# Patient Record
Sex: FEMALE | Race: WHITE | Employment: OTHER | URBAN - METROPOLITAN AREA
[De-identification: names, ages, dates, MRNs, and addresses within clinical notes are randomized per-mention and may not be internally consistent; named-entity substitution may affect disease eponyms.]

---

## 2017-10-19 ENCOUNTER — TELEPHONE (OUTPATIENT)
Dept: INTERNAL MEDICINE CLINIC | Age: 67
End: 2017-10-19

## 2017-10-19 NOTE — TELEPHONE ENCOUNTER
Patient informed  Mammogram results reviewed calcifications in right breast are likely benign but  Need to repeat in 6 mos to assure stability   Follow up in 6 mos was scheduled

## 2017-11-14 RX ORDER — CODEINE PHOSPHATE AND GUAIFENESIN 10; 100 MG/5ML; MG/5ML
5 SOLUTION ORAL
Qty: 240 ML | Refills: 0 | Status: SHIPPED | OUTPATIENT
Start: 2017-11-14 | End: 2018-04-26 | Stop reason: ALTCHOICE

## 2017-11-20 DIAGNOSIS — B02.9 HERPES ZOSTER WITHOUT COMPLICATION: Primary | ICD-10-CM

## 2017-11-20 RX ORDER — VALACYCLOVIR HYDROCHLORIDE 1 G/1
1000 TABLET, FILM COATED ORAL 3 TIMES DAILY
Qty: 21 TAB | Refills: 0 | Status: SHIPPED | OUTPATIENT
Start: 2017-11-20 | End: 2018-04-26 | Stop reason: SDUPTHER

## 2018-01-08 DIAGNOSIS — G47.09 OTHER INSOMNIA: Primary | ICD-10-CM

## 2018-01-08 RX ORDER — DIAZEPAM 5 MG/1
TABLET ORAL
Qty: 60 TAB | Refills: 5 | Status: SHIPPED | OUTPATIENT
Start: 2018-01-08 | End: 2018-04-26 | Stop reason: SDUPTHER

## 2018-01-08 NOTE — TELEPHONE ENCOUNTER
Requested Prescriptions     Pending Prescriptions Disp Refills    diazePAM (VALIUM) 5 mg tablet 60 Tab 5     Sig: Take 1 to 2 tablets at bedtime, as needed.

## 2018-04-19 ENCOUNTER — LAB ONLY (OUTPATIENT)
Dept: INTERNAL MEDICINE CLINIC | Age: 68
End: 2018-04-19

## 2018-04-19 DIAGNOSIS — E03.9 HYPOTHYROIDISM, UNSPECIFIED TYPE: ICD-10-CM

## 2018-04-19 DIAGNOSIS — E78.5 HYPERLIPIDEMIA, UNSPECIFIED HYPERLIPIDEMIA TYPE: ICD-10-CM

## 2018-04-19 DIAGNOSIS — Z91.89 AT RISK FOR HEPATITIS: ICD-10-CM

## 2018-04-19 DIAGNOSIS — Z00.00 ROUTINE PHYSICAL EXAMINATION: Primary | ICD-10-CM

## 2018-04-19 LAB
ALBUMIN SERPL-MCNC: 4.4 G/DL (ref 3.9–5.4)
ALKALINE PHOS POC: 90 U/L (ref 38–126)
ALT SERPL-CCNC: 38 U/L (ref 9–52)
AST SERPL-CCNC: 26 U/L (ref 14–36)
BACTERIA UA POCT, BACTPOCT: NORMAL
BILIRUB UR QL STRIP: NEGATIVE
BUN BLD-MCNC: 20 MG/DL (ref 7–17)
CALCIUM BLD-MCNC: 9.7 MG/DL (ref 8.4–10.2)
CASTS UA POCT: 0
CHLORIDE BLD-SCNC: 105 MMOL/L (ref 98–107)
CHOLEST SERPL-MCNC: 149 MG/DL (ref 0–200)
CK (CPK) POC: 44 U/L (ref 30–135)
CLUE CELLS, CLUEPOCT: NEGATIVE
CO2 POC: 28 MMOL/L (ref 22–32)
CREAT BLD-MCNC: 0.7 MG/DL (ref 0.7–1.2)
CRYSTALS UA POCT, CRYSPOCT: NEGATIVE
EGFR (POC): 90.9
EPITHELIAL CELLS POCT: NORMAL
GLUCOSE POC: 105 MG/DL (ref 65–105)
GLUCOSE UR-MCNC: NEGATIVE MG/DL
GRAN# POC: 3.1 K/UL (ref 2–7.8)
GRAN% POC: 50.6 % (ref 37–92)
HCT VFR BLD CALC: 40.3 % (ref 37–51)
HDLC SERPL-MCNC: 73 MG/DL (ref 35–130)
HGB BLD-MCNC: 13.7 G/DL (ref 12–18)
IRON POC: 106 UG/DL (ref 37–170)
IRON SATURATION POC: 27 % (ref 15–55)
KETONES P FAST UR STRIP-MCNC: NEGATIVE MG/DL
LDL CHOLESTEROL POC: 58.6 MG/DL (ref 0–130)
LY# POC: 2.5 K/UL (ref 0.6–4.1)
LY% POC: 44.7 % (ref 10–58.5)
MCH RBC QN: 31.7 PG (ref 26–32)
MCHC RBC-ENTMCNC: 34 G/DL (ref 30–36)
MCV RBC: 93 FL (ref 80–97)
MID #, POC: 0.2 K/UL (ref 0–1.8)
MID% POC: 4.7 % (ref 0.1–24)
MUCUS UA POCT, MUCPOCT: NORMAL
PH UR STRIP: 5 [PH] (ref 5–7)
PLATELET # BLD: 280 K/UL (ref 140–440)
POTASSIUM SERPL-SCNC: 4.6 MMOL/L (ref 3.6–5)
PROT SERPL-MCNC: 7 G/DL (ref 6.3–8.2)
PROT UR QL STRIP: NEGATIVE
RBC # BLD: 4.32 M/UL (ref 4.2–6.3)
RBC UA POCT, RBCPOCT: 0
SODIUM SERPL-SCNC: 145 MMOL/L (ref 137–145)
SP GR UR STRIP: 1.02 (ref 1.01–1.02)
T4 FREE SERPL-MCNC: 0.87 NG/DL (ref 0.71–1.85)
TCHOL/HDL RATIO (POC): 2 (ref 0–4)
TIBC POC: 387 UG/DL (ref 265–497)
TOTAL BILIRUBIN POC: 0.7 MG/DL (ref 0.2–1.3)
TRICH UA POCT, TRICHPOC: NEGATIVE
TRIGL SERPL-MCNC: 87 MG/DL (ref 0–200)
TSH BLD-ACNC: 2.14 UIU/ML (ref 0.4–4.2)
UA UROBILINOGEN AMB POC: NORMAL (ref 0.2–1)
URINALYSIS CLARITY POC: CLEAR
URINALYSIS COLOR POC: NORMAL
URINE BLOOD POC: NEGATIVE
URINE CULT COMMENT, POCT: NORMAL
URINE LEUKOCYTES POC: NORMAL
URINE NITRITES POC: NEGATIVE
VITAMIN D POC: 38.1 NG/ML (ref 30–96)
VLDLC SERPL CALC-MCNC: 17.4 MG/DL
WBC # BLD: 5.8 K/UL (ref 4.1–10.9)
WBC UA POCT, WBCPOCT: NORMAL
YEAST UA POCT, YEASTPOC: NEGATIVE

## 2018-04-20 LAB — HCV AB S/CO SERPL IA: <0.1 S/CO RATIO (ref 0–0.9)

## 2018-04-26 ENCOUNTER — OFFICE VISIT (OUTPATIENT)
Dept: INTERNAL MEDICINE CLINIC | Age: 68
End: 2018-04-26

## 2018-04-26 VITALS
HEIGHT: 59 IN | HEART RATE: 72 BPM | TEMPERATURE: 98 F | SYSTOLIC BLOOD PRESSURE: 118 MMHG | WEIGHT: 124 LBS | BODY MASS INDEX: 25 KG/M2 | OXYGEN SATURATION: 97 % | DIASTOLIC BLOOD PRESSURE: 74 MMHG

## 2018-04-26 DIAGNOSIS — Z23 ENCOUNTER FOR IMMUNIZATION: ICD-10-CM

## 2018-04-26 DIAGNOSIS — B02.9 HERPES ZOSTER WITHOUT COMPLICATION: ICD-10-CM

## 2018-04-26 DIAGNOSIS — K58.0 IRRITABLE BOWEL SYNDROME WITH DIARRHEA: ICD-10-CM

## 2018-04-26 DIAGNOSIS — G47.09 OTHER INSOMNIA: ICD-10-CM

## 2018-04-26 DIAGNOSIS — M81.0 POSTMENOPAUSAL OSTEOPOROSIS: Primary | ICD-10-CM

## 2018-04-26 DIAGNOSIS — Z00.00 ROUTINE PHYSICAL EXAMINATION: Primary | ICD-10-CM

## 2018-04-26 LAB — SPIROMETRY INTERPRETATION, SPITPOCT: NORMAL

## 2018-04-26 RX ORDER — DIAZEPAM 5 MG/1
TABLET ORAL
Qty: 60 TAB | Refills: 5 | Status: SHIPPED | OUTPATIENT
Start: 2018-04-26 | End: 2018-08-26 | Stop reason: SDUPTHER

## 2018-04-26 RX ORDER — DICYCLOMINE HYDROCHLORIDE 10 MG/1
10 CAPSULE ORAL 4 TIMES DAILY
Qty: 120 CAP | Refills: 11 | Status: SHIPPED | OUTPATIENT
Start: 2018-04-26 | End: 2019-05-14

## 2018-04-26 RX ORDER — RANITIDINE 300 MG/1
300 TABLET ORAL DAILY
Qty: 90 TAB | Refills: 3 | Status: SHIPPED | OUTPATIENT
Start: 2018-04-26 | End: 2018-08-27 | Stop reason: SDUPTHER

## 2018-04-26 RX ORDER — VALACYCLOVIR HYDROCHLORIDE 1 G/1
1000 TABLET, FILM COATED ORAL 3 TIMES DAILY
Qty: 21 TAB | Refills: 0 | Status: SHIPPED | OUTPATIENT
Start: 2018-04-26 | End: 2019-05-14

## 2018-04-26 NOTE — PROGRESS NOTES
Reviewed record in preparation for visit and have obtained necessary documentation. Identified pt with two pt identifiers(name and ). Chief Complaint   Patient presents with    Complete Physical        Coordination of Care Questionnaire:  :     1) Have you been to an emergency room, urgent care clinic since your last visit? Hospitalized since your last visit? When:  Where:  Diagnosis:          2) Have you seen or consulted any other health care providers outside of [address] [place] [address] since your last visit? When:  Where:  Diagnosis:   (Include any pap smears or colon screenings in this section.)  Reviewed record in preparation for visit and have obtained necessary documentation. Identified pt with two pt identifiers(name and ). Chief Complaint   Patient presents with    Complete Physical        Coordination of Care Questionnaire:  :     1) Have you been to an emergency room, urgent care clinic since your last visit? No    Hospitalized since your last visit? No              2) Have you seen or consulted any other health care providers outside of [address] [place] [address] since your last visit?  No

## 2018-04-26 NOTE — PATIENT INSTRUCTIONS

## 2018-04-26 NOTE — PROGRESS NOTES
Kimberly Sun comes to the office today for a comprehensive medical evaluation. Past Medical/Surgical History:  1. Asthma. 2. Hypercholesterolemia. 3. G3, P3, AB0, S/P  x three. 4. S/P laparoscopic lysis of adhesions. 5. Rosacea. 6. History of calcium oxylate renal calculi, S/P lithotripsy, most recently . 7. Moderately severe osteopenia, T score -2.2, on Reclast. She is overdue for her second infusion. 8. Recurrent herpes labialis. 9. Arteriosclerotic heart disease. CT of the heart in May, 2013 showed a coronary artery calcium score of 37.  10. Anderson fracture of the left fifth metatarsal . 11. Chronic insomnia. 12. Chronic low back pain with sciatica. 13. She had a patella dislocation and is being treated conservatively with physical therapy. That was in . Current Medications:  1. Atorvastatin 40 mg daily. 2. Diazepam 5 mg q.h.s. prn.  3. Dulera 100/5, two puffs b.i.d.  4. Singulair 10 mg daily. 5. Culturelle, one daily. 6. Zantac 300 mg daily. 7. Valtrex prn. Drug Allergies:  1. Penicillin. 2. She got an urticarial rash to TDAP. Social History:  She is . She works for a nonprofit as a  for [employer] She is a lifelong nonsmoker and social drinker. She is currently working full time. Her youngest daughter still lives at the house. Family History:  Her mother  at age 76 from a massive myocardial infarction. She also had hypertension and hypercholesterolemia. She has a brother who had angioplasty and stroke in his early 46s. Another brother has hypertension, hypercholesterolemia and arteriosclerotic heart disease. Her father is alive and well at age 80. He's had angioplasty, prostate cancer, hypertension and hypercholesterolemia. She had a paternal aunt with breast cancer. She had two daughters, both had congenital duplication of the ureter. Review of Systems:  Kimberly Sun is still in PT for her knee.   She says it's helping quite a bit. She's not had any further episodes of patella dislocation. She started having problems with her left shoulder, which she describes as it feels like it's coming out of joint. It's been bothering her about a month. She denies injury. She's having intermittent episodes of diarrhea and has had some fecal incontinence on occasion. This is intermittent. Right now she's having normal bowel movements and no pain. She works 65 hours a week. She is not exercising. Her mother in law has been in the hospital for two months with hip fracture and multiple complications. She stayed at the hospital every night until midnight. Her daughter is in counseling for drug use and still living at home. All of these have stressed her considerably. Physical Examination:  VITALS:  HT: 5'.  WT: 124. BP: 118/74. T: 98.  P: 72 and regular. O2 sat on room air: 97%. HEENT:  Head - normocephalic, atraumatic. Eyes - pupils midrange, equal directly and consensually. Extraocular movements are normal.  Ears, nose and throat are normal.  NECK:  Without JVD, adenopathy, thyromegaly or carotid bruits. CHEST:  Lungs are clear. BREASTS:  Without masses. CV:  Heart - quiet precordium. Regular rate and rhythm. No audible murmurs or gallops. ABDOMEN:  Soft, non tender, without hepatosplenomegaly, no abdominal masses or bruits or ascites are noted. EXTREMITIES:  Without edema. Pulses are normal.  SKIN:  No suspicious lesions. NEUROLOGIC:  Cranial nerves II-XII are intact. Strength, gait and mental status are normal for age. Studies:  Pulmonary function testing is normal. Resting EKG - normal sinus rhythm, within normal limits. Hearing testing shows a mild symmetrical high frequency hearing loss. Laboratory:  Hemoglobin and iron stores are normal.  Blood sugar, potassium, kidney function, calcium level and liver function tests are normal.  Urinalysis is normal.  TSH 2.1. Hep C antibody negative.   Vitamin D level 38.  Total cholesterol 149, triglycerides 87, LDL 59, HDL 73. Impression:  1. IBS with intermittent diarrhea and incontinence. 2. Asthma. 3. Hypercholesterolemia. 4. Arteriosclerotic heart disease as outlined. 5. Chronic insomnia. 6. Moderately severe osteopenia. 7. S/P colonoscopic polypectomy. Pathology serrated adenoma. 8. GERD with globus pharyngeus. Plan:  1. Continue current medication regimen as outlined. 2. Pneumovax 23 given today. She'll return after a month and get Shingrix. Tetanus, whooping cough, Prevnar 13 are up to date. 3. She's due for an infusion for Reclast.  Will make those arrangements. I told her I wanted her to have three infusions and then we'll repeat a bone density test.  4. Next colonoscopy due in July, 2019.   5. I see no reason to repeat heart scan at this time. 6. Mammogram is current at [facility] and we will get those reports. 7. Follow up here annually or sooner prn. All screenings were reviewed and results discussed with the patient, who verbalized understand and agreement with the plans. A copy of the after visit summary with a personalized health plan was provided to the patient on the day of the visit.

## 2018-05-10 RX ORDER — ZOLEDRONIC ACID 5 MG/100ML
5 INJECTION, SOLUTION INTRAVENOUS ONCE
Status: COMPLETED | OUTPATIENT
Start: 2018-05-14 | End: 2018-05-14

## 2018-05-14 ENCOUNTER — HOSPITAL ENCOUNTER (OUTPATIENT)
Dept: INFUSION THERAPY | Age: 68
Discharge: HOME | End: 2018-05-14
Payer: COMMERCIAL

## 2018-05-14 VITALS
HEART RATE: 78 BPM | RESPIRATION RATE: 18 BRPM | TEMPERATURE: 97.2 F | SYSTOLIC BLOOD PRESSURE: 122 MMHG | DIASTOLIC BLOOD PRESSURE: 74 MMHG

## 2018-05-14 LAB
ALBUMIN SERPL-MCNC: 3.9 G/DL (ref 3.5–5)
ANION GAP SERPL CALC-SCNC: 8 MMOL/L (ref 5–15)
BUN SERPL-MCNC: 26 MG/DL (ref 6–20)
BUN/CREAT SERPL: 33 (ref 12–20)
CALCIUM SERPL-MCNC: 9.1 MG/DL (ref 8.5–10.1)
CHLORIDE SERPL-SCNC: 106 MMOL/L (ref 97–108)
CO2 SERPL-SCNC: 26 MMOL/L (ref 21–32)
CREAT SERPL-MCNC: 0.8 MG/DL (ref 0.55–1.02)
GLUCOSE SERPL-MCNC: 100 MG/DL (ref 65–100)
PHOSPHATE SERPL-MCNC: 3.6 MG/DL (ref 2.6–4.7)
POTASSIUM SERPL-SCNC: 4 MMOL/L (ref 3.5–5.1)
SODIUM SERPL-SCNC: 140 MMOL/L (ref 136–145)

## 2018-05-14 PROCEDURE — 36415 COLL VENOUS BLD VENIPUNCTURE: CPT | Performed by: INTERNAL MEDICINE

## 2018-05-14 PROCEDURE — 96374 THER/PROPH/DIAG INJ IV PUSH: CPT

## 2018-05-14 PROCEDURE — 80069 RENAL FUNCTION PANEL: CPT | Performed by: INTERNAL MEDICINE

## 2018-05-14 PROCEDURE — 74011250636 HC RX REV CODE- 250/636: Performed by: INTERNAL MEDICINE

## 2018-05-14 RX ORDER — SODIUM CHLORIDE 9 MG/ML
50 INJECTION, SOLUTION INTRAVENOUS AS NEEDED
Status: DISPENSED | OUTPATIENT
Start: 2018-05-14 | End: 2018-05-15

## 2018-05-14 RX ORDER — SODIUM CHLORIDE 0.9 % (FLUSH) 0.9 %
5-10 SYRINGE (ML) INJECTION AS NEEDED
Status: ACTIVE | OUTPATIENT
Start: 2018-05-14 | End: 2018-05-15

## 2018-05-14 RX ADMIN — Medication 10 ML: at 09:20

## 2018-05-14 RX ADMIN — Medication 10 ML: at 10:26

## 2018-05-14 RX ADMIN — ZOLEDRONIC ACID 5 MG: 5 INJECTION, SOLUTION INTRAVENOUS at 10:10

## 2018-05-14 NOTE — PROGRESS NOTES
[facility] VISIT NOTE    0900  Pt arrived at [facility] ambulatory and in no distress for Reclast infusion. Assessment completed, no new complaints at this time. PIV started with no difficulty. Positive blood return noted and labs drawn. Recent Results (from the past 12 hour(s))   RENAL FUNCTION PANEL    Collection Time: 05/14/18  9:21 AM   Result Value Ref Range    Sodium 140 136 - 145 mmol/L    Potassium 4.0 3.5 - 5.1 mmol/L    Chloride 106 97 - 108 mmol/L    CO2 26 21 - 32 mmol/L    Anion gap 8 5 - 15 mmol/L    Glucose 100 65 - 100 mg/dL    BUN 26 (H) 6 - 20 MG/DL    Creatinine 0.80 0.55 - 1.02 MG/DL    BUN/Creatinine ratio 33 (H) 12 - 20      GFR est AA >60 >60 ml/min/1.73m2    GFR est non-AA >60 >60 ml/min/1.73m2    Calcium 9.1 8.5 - 10.1 MG/DL    Phosphorus 3.6 2.6 - 4.7 MG/DL    Albumin 3.9 3.5 - 5.0 g/dL     Medications received:  Reclast IV    Patient Vitals for the past 12 hrs:   Temp Pulse Resp BP   05/14/18 1026 97.2 °F (36.2 °C) 78 18 122/74   05/14/18 0904 96.8 °F (36 °C) 80 18 119/58     Tolerated treatment well, no adverse reaction noted. PIV removed per protocol. 1030  D/C'd from [facility] ambulatory and in no distress. Next appointment is to be scheduled by MD/pt.

## 2018-07-02 RX ORDER — ATORVASTATIN CALCIUM 40 MG/1
TABLET, FILM COATED ORAL
Qty: 90 TAB | Refills: 0 | Status: SHIPPED | OUTPATIENT
Start: 2018-07-02 | End: 2018-08-26 | Stop reason: SDUPTHER

## 2018-07-02 RX ORDER — MONTELUKAST SODIUM 10 MG/1
TABLET ORAL
Qty: 90 TAB | Refills: 0 | Status: SHIPPED | OUTPATIENT
Start: 2018-07-02 | End: 2018-08-26 | Stop reason: SDUPTHER

## 2018-08-26 DIAGNOSIS — G47.09 OTHER INSOMNIA: ICD-10-CM

## 2018-08-26 RX ORDER — DIAZEPAM 5 MG/1
TABLET ORAL
Qty: 60 TAB | Refills: 0 | Status: SHIPPED | OUTPATIENT
Start: 2018-08-26 | End: 2018-08-27 | Stop reason: SDUPTHER

## 2018-08-26 RX ORDER — ATORVASTATIN CALCIUM 40 MG/1
TABLET, FILM COATED ORAL
Qty: 90 TAB | Refills: 0 | Status: SHIPPED | OUTPATIENT
Start: 2018-08-26 | End: 2018-08-27 | Stop reason: SDUPTHER

## 2018-08-26 RX ORDER — MONTELUKAST SODIUM 10 MG/1
TABLET ORAL
Qty: 90 TAB | Refills: 0 | Status: SHIPPED | OUTPATIENT
Start: 2018-08-26 | End: 2018-08-27 | Stop reason: SDUPTHER

## 2018-08-27 DIAGNOSIS — K58.0 IRRITABLE BOWEL SYNDROME WITH DIARRHEA: ICD-10-CM

## 2018-08-27 DIAGNOSIS — G47.09 OTHER INSOMNIA: ICD-10-CM

## 2018-08-27 RX ORDER — DIAZEPAM 5 MG/1
TABLET ORAL
Qty: 60 TAB | Refills: 0 | Status: SHIPPED | OUTPATIENT
Start: 2018-08-27 | End: 2018-10-06 | Stop reason: SDUPTHER

## 2018-08-27 RX ORDER — MONTELUKAST SODIUM 10 MG/1
10 TABLET ORAL DAILY
Qty: 90 TAB | Refills: 3 | Status: SHIPPED | OUTPATIENT
Start: 2018-08-27 | End: 2019-05-14

## 2018-08-27 RX ORDER — ATORVASTATIN CALCIUM 40 MG/1
40 TABLET, FILM COATED ORAL DAILY
Qty: 90 TAB | Refills: 3 | Status: SHIPPED | OUTPATIENT
Start: 2018-08-27 | End: 2019-11-17 | Stop reason: SDUPTHER

## 2018-08-27 RX ORDER — RANITIDINE 300 MG/1
300 TABLET ORAL DAILY
Qty: 90 TAB | Refills: 3 | Status: SHIPPED | OUTPATIENT
Start: 2018-08-27 | End: 2020-07-07

## 2018-09-05 DIAGNOSIS — L29.9 ITCHING: Primary | ICD-10-CM

## 2018-09-05 RX ORDER — HYDROXYZINE 25 MG/1
25 TABLET, FILM COATED ORAL
Qty: 40 TAB | Refills: 4 | Status: SHIPPED | OUTPATIENT
Start: 2018-09-05 | End: 2018-09-15

## 2018-09-20 ENCOUNTER — OFFICE VISIT (OUTPATIENT)
Dept: INTERNAL MEDICINE CLINIC | Age: 68
End: 2018-09-20

## 2018-09-20 VITALS
WEIGHT: 126 LBS | BODY MASS INDEX: 25.45 KG/M2 | SYSTOLIC BLOOD PRESSURE: 143 MMHG | HEART RATE: 81 BPM | OXYGEN SATURATION: 99 % | TEMPERATURE: 97.7 F | DIASTOLIC BLOOD PRESSURE: 82 MMHG

## 2018-09-20 DIAGNOSIS — L13.0 DERMATITIS HERPETIFORMIS: Primary | ICD-10-CM

## 2018-09-20 NOTE — PROGRESS NOTES
Reviewed record in preparation for visit and have obtained necessary documentation. Identified pt with two pt identifiers(name and ). Chief Complaint   Patient presents with    Rash     itching   not sleeping        Coordination of Care Questionnaire:  :     1) Have you been to an emergency room, urgent care clinic since your last visit? No     Hospitalized since your last visit? No               2) Have you seen or consulted any other health care providers outside of [address] [place] [address] since your last visit?    Dermatology     When:

## 2018-09-20 NOTE — PROGRESS NOTES
Subjective: Denver Haley comes to the office today requesting lab work for Dr. Maite Washington. She has an intense pruritic rash over her whole body for the last month or so. She's been on topical and systemic steroids with no relief. 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 She had her third skin biopsy today. Plan:  1. I have ordered the requested tests, which I will discuss with Denver Haley and fax to Dr. Maite Washington when they are available. 2. I'd also like to review the last skin biopsy report.

## 2018-09-25 LAB
GLIADIN PEPTIDE IGA SER-ACNC: 9 UNITS (ref 0–19)
GLIADIN PEPTIDE IGG SER-ACNC: 33 UNITS (ref 0–19)
IGA SERPL-MCNC: 201 MG/DL (ref 87–352)
IGG SERPL-MCNC: 909 MG/DL (ref 700–1600)
IGM SERPL-MCNC: 91 MG/DL (ref 26–217)
PROT PATTERN SERPL IFE-IMP: NORMAL
TTG IGA SER-ACNC: <2 U/ML (ref 0–3)
TTG IGG SER-ACNC: <2 U/ML (ref 0–5)

## 2018-09-26 LAB
GRAN# POC: 3.1 K/UL (ref 2–7.8)
GRAN% POC: 56.2 % (ref 37–92)
HCT VFR BLD CALC: 41.3 % (ref 37–51)
HGB BLD-MCNC: 13.6 G/DL (ref 12–18)
LY# POC: 2.1 K/UL (ref 0.6–4.1)
LY% POC: 39.9 % (ref 10–58.5)
MCH RBC QN: 30.9 PG (ref 26–32)
MCHC RBC-ENTMCNC: 32.9 G/DL (ref 30–36)
MCV RBC: 94 FL (ref 80–97)
MID #, POC: 0.2 K/UL (ref 0–1.8)
MID% POC: 3.9 % (ref 0.1–24)
PLATELET # BLD: 266 K/UL (ref 140–440)
RBC # BLD: 4.39 M/UL (ref 4.2–6.3)
TSH BLD-ACNC: 1.52 UIU/ML (ref 0.4–4.2)
WBC # BLD: 5.4 K/UL (ref 4.1–10.9)

## 2018-10-06 DIAGNOSIS — G47.09 OTHER INSOMNIA: ICD-10-CM

## 2018-10-08 DIAGNOSIS — G47.09 OTHER INSOMNIA: ICD-10-CM

## 2018-10-08 RX ORDER — DIAZEPAM 5 MG/1
TABLET ORAL
Qty: 60 TAB | Refills: 0 | Status: SHIPPED | OUTPATIENT
Start: 2018-10-08 | End: 2019-05-14

## 2018-10-08 RX ORDER — DIAZEPAM 5 MG/1
TABLET ORAL
Qty: 60 TAB | Refills: 0 | Status: SHIPPED | OUTPATIENT
Start: 2018-10-08 | End: 2020-07-08 | Stop reason: SDUPTHER

## 2018-10-29 RX ORDER — PREDNISONE 10 MG/1
10 TABLET ORAL DAILY
Qty: 52 TAB | Refills: 0 | Status: SHIPPED | OUTPATIENT
Start: 2018-10-29 | End: 2019-02-08 | Stop reason: SDUPTHER

## 2018-11-20 ENCOUNTER — HOSPITAL ENCOUNTER (OUTPATIENT)
Dept: GENERAL RADIOLOGY | Age: 68
Discharge: HOME | End: 2018-11-20
Attending: ALLERGY & IMMUNOLOGY
Payer: COMMERCIAL

## 2018-11-20 DIAGNOSIS — R05.9 COUGH: ICD-10-CM

## 2018-11-20 PROCEDURE — 71046 X-RAY EXAM CHEST 2 VIEWS: CPT

## 2019-02-08 ENCOUNTER — HOSPITAL ENCOUNTER (OUTPATIENT)
Dept: GENERAL RADIOLOGY | Age: 69
Discharge: HOME | End: 2019-02-08
Attending: INTERNAL MEDICINE
Payer: COMMERCIAL

## 2019-02-08 DIAGNOSIS — R05.9 COUGH: ICD-10-CM

## 2019-02-08 PROCEDURE — 71046 X-RAY EXAM CHEST 2 VIEWS: CPT

## 2019-02-09 PROBLEM — R94.39 EBT (ELECTRON BEAM TOMOGRAPHY) ABNORMAL: Status: ACTIVE | Noted: 2019-02-09

## 2019-02-09 PROBLEM — F51.04 CHRONIC INSOMNIA: Status: ACTIVE | Noted: 2019-02-09

## 2019-02-09 PROBLEM — M75.42 IMPINGEMENT SYNDROME OF LEFT SHOULDER: Status: ACTIVE | Noted: 2019-02-09

## 2019-02-09 PROBLEM — M85.80 OSTEOPENIA: Status: ACTIVE | Noted: 2019-02-09

## 2019-02-09 PROBLEM — J45.909 MODERATE ASTHMA: Status: ACTIVE | Noted: 2019-02-09

## 2019-02-09 PROBLEM — N20.0 KIDNEY STONES, CALCIUM OXALATE: Status: ACTIVE | Noted: 2019-02-09

## 2019-02-09 PROBLEM — B00.1 FEVER BLISTER: Status: ACTIVE | Noted: 2019-02-09

## 2019-06-06 ENCOUNTER — HOSPITAL ENCOUNTER (OUTPATIENT)
Dept: BONE DENSITY | Age: 69
Discharge: HOME | End: 2019-06-06
Attending: INTERNAL MEDICINE
Payer: COMMERCIAL

## 2019-06-06 DIAGNOSIS — M85.80 OSTEOPENIA, UNSPECIFIED LOCATION: ICD-10-CM

## 2019-06-06 DIAGNOSIS — Z78.0 POST-MENOPAUSAL: ICD-10-CM

## 2019-06-06 PROCEDURE — 77080 DXA BONE DENSITY AXIAL: CPT

## 2019-06-12 PROBLEM — M81.0 POST-MENOPAUSAL OSTEOPOROSIS: Status: ACTIVE | Noted: 2019-06-12

## 2019-07-12 RX ORDER — ZOLEDRONIC ACID 5 MG/100ML
5 INJECTION, SOLUTION INTRAVENOUS ONCE
Status: COMPLETED | OUTPATIENT
Start: 2019-07-17 | End: 2019-07-17

## 2019-07-12 RX ORDER — SODIUM CHLORIDE 9 MG/ML
150 INJECTION, SOLUTION INTRAVENOUS AS NEEDED
Status: DISCONTINUED | OUTPATIENT
Start: 2019-07-17 | End: 2019-07-18 | Stop reason: HOSPADM

## 2019-07-17 ENCOUNTER — HOSPITAL ENCOUNTER (OUTPATIENT)
Dept: INFUSION THERAPY | Age: 69
Discharge: HOME | End: 2019-07-17
Payer: COMMERCIAL

## 2019-07-17 VITALS
DIASTOLIC BLOOD PRESSURE: 73 MMHG | SYSTOLIC BLOOD PRESSURE: 145 MMHG | RESPIRATION RATE: 16 BRPM | TEMPERATURE: 97.8 F | HEART RATE: 68 BPM | BODY MASS INDEX: 24.41 KG/M2 | WEIGHT: 125 LBS

## 2019-07-17 LAB
ALBUMIN SERPL-MCNC: 4 G/DL (ref 3.5–5)
ANION GAP SERPL CALC-SCNC: 6 MMOL/L (ref 5–15)
BUN SERPL-MCNC: 20 MG/DL (ref 6–20)
BUN/CREAT SERPL: 28 (ref 12–20)
CALCIUM SERPL-MCNC: 9.4 MG/DL (ref 8.5–10.1)
CHLORIDE SERPL-SCNC: 108 MMOL/L (ref 97–108)
CO2 SERPL-SCNC: 25 MMOL/L (ref 21–32)
CREAT SERPL-MCNC: 0.72 MG/DL (ref 0.55–1.02)
GLUCOSE SERPL-MCNC: 92 MG/DL (ref 65–100)
PHOSPHATE SERPL-MCNC: 3.6 MG/DL (ref 2.6–4.7)
POTASSIUM SERPL-SCNC: 4.4 MMOL/L (ref 3.5–5.1)
SODIUM SERPL-SCNC: 139 MMOL/L (ref 136–145)

## 2019-07-17 PROCEDURE — 36415 COLL VENOUS BLD VENIPUNCTURE: CPT

## 2019-07-17 PROCEDURE — 80069 RENAL FUNCTION PANEL: CPT

## 2019-07-17 PROCEDURE — 96374 THER/PROPH/DIAG INJ IV PUSH: CPT

## 2019-07-17 PROCEDURE — 74011250636 HC RX REV CODE- 250/636: Performed by: INTERNAL MEDICINE

## 2019-07-17 RX ADMIN — ZOLEDRONIC ACID 5 MG: 5 INJECTION, SOLUTION INTRAVENOUS at 17:31

## 2019-07-17 RX ADMIN — SODIUM CHLORIDE 150 ML/HR: 900 INJECTION, SOLUTION INTRAVENOUS at 16:20

## 2019-07-17 NOTE — PROGRESS NOTES
[facility] Note:  1610Pt arrived at [facility] ambulatory and in no distress for reclast and labs   Assessment stable, no new complaints voiced. Medications received:  reclast    1800 Tolerated treatment well, no adverse reaction noted. D/Cd from [facility] ambulatory and in no distress accompanied by self. Next appt none at this time  She states that sh emay switch to other drug; was to take reclast for 3 years. .  Visit Vitals  /82   Pulse 98   Temp 97.8 °F (36.6 °C)   Resp 16   Wt 56.7 kg (125 lb)   BMI 24.41 kg/m²     Recent Results (from the past 12 hour(s))   RENAL FUNCTION PANEL    Collection Time: 07/17/19  4:28 PM   Result Value Ref Range    Sodium 139 136 - 145 mmol/L    Potassium 4.4 3.5 - 5.1 mmol/L    Chloride 108 97 - 108 mmol/L    CO2 25 21 - 32 mmol/L    Anion gap 6 5 - 15 mmol/L    Glucose 92 65 - 100 mg/dL    BUN 20 6 - 20 MG/DL    Creatinine 0.72 0.55 - 1.02 MG/DL    BUN/Creatinine ratio 28 (H) 12 - 20      GFR est AA >60 >60 ml/min/1.73m2    GFR est non-AA >60 >60 ml/min/1.73m2    Calcium 9.4 8.5 - 10.1 MG/DL    Phosphorus 3.6 2.6 - 4.7 MG/DL    Albumin 4.0 3.5 - 5.0 g/dL

## 2019-08-09 ENCOUNTER — ANESTHESIA (OUTPATIENT)
Dept: ENDOSCOPY | Age: 69
End: 2019-08-09
Payer: COMMERCIAL

## 2019-08-09 ENCOUNTER — HOSPITAL ENCOUNTER (OUTPATIENT)
Age: 69
Setting detail: OUTPATIENT SURGERY
Discharge: HOME | End: 2019-08-09
Attending: INTERNAL MEDICINE | Admitting: INTERNAL MEDICINE
Payer: COMMERCIAL

## 2019-08-09 ENCOUNTER — ANESTHESIA EVENT (OUTPATIENT)
Dept: ENDOSCOPY | Age: 69
End: 2019-08-09
Payer: COMMERCIAL

## 2019-08-09 VITALS
SYSTOLIC BLOOD PRESSURE: 124 MMHG | BODY MASS INDEX: 23.44 KG/M2 | DIASTOLIC BLOOD PRESSURE: 74 MMHG | HEART RATE: 72 BPM | WEIGHT: 120 LBS | OXYGEN SATURATION: 99 % | TEMPERATURE: 97.8 F | RESPIRATION RATE: 20 BRPM

## 2019-08-09 PROCEDURE — 77030038665 HC SOL ELEVIEW INJ AGNT ARPH -B: Performed by: INTERNAL MEDICINE

## 2019-08-09 PROCEDURE — 77030003657 HC NDL SCLER BSC -B: Performed by: INTERNAL MEDICINE

## 2019-08-09 PROCEDURE — 88305 TISSUE EXAM BY PATHOLOGIST: CPT

## 2019-08-09 PROCEDURE — 74011250637 HC RX REV CODE- 250/637: Performed by: INTERNAL MEDICINE

## 2019-08-09 PROCEDURE — 74011250636 HC RX REV CODE- 250/636: Performed by: NURSE ANESTHETIST, CERTIFIED REGISTERED

## 2019-08-09 PROCEDURE — 77030013992 HC SNR POLYP ENDOSC BSC -B: Performed by: INTERNAL MEDICINE

## 2019-08-09 PROCEDURE — 76040000007: Performed by: INTERNAL MEDICINE

## 2019-08-09 PROCEDURE — 76060000032 HC ANESTHESIA 0.5 TO 1 HR: Performed by: INTERNAL MEDICINE

## 2019-08-09 RX ORDER — SODIUM CHLORIDE 0.9 % (FLUSH) 0.9 %
5-40 SYRINGE (ML) INJECTION EVERY 8 HOURS
Status: DISCONTINUED | OUTPATIENT
Start: 2019-08-09 | End: 2019-08-09 | Stop reason: HOSPADM

## 2019-08-09 RX ORDER — DEXTROMETHORPHAN/PSEUDOEPHED 2.5-7.5/.8
1.2 DROPS ORAL
Status: DISCONTINUED | OUTPATIENT
Start: 2019-08-09 | End: 2019-08-09 | Stop reason: HOSPADM

## 2019-08-09 RX ORDER — NALOXONE HYDROCHLORIDE 0.4 MG/ML
0.4 INJECTION, SOLUTION INTRAMUSCULAR; INTRAVENOUS; SUBCUTANEOUS
Status: DISCONTINUED | OUTPATIENT
Start: 2019-08-09 | End: 2019-08-09 | Stop reason: HOSPADM

## 2019-08-09 RX ORDER — EPINEPHRINE 0.1 MG/ML
1 INJECTION INTRACARDIAC; INTRAVENOUS
Status: DISCONTINUED | OUTPATIENT
Start: 2019-08-09 | End: 2019-08-09 | Stop reason: HOSPADM

## 2019-08-09 RX ORDER — PROPOFOL 10 MG/ML
INJECTION, EMULSION INTRAVENOUS AS NEEDED
Status: DISCONTINUED | OUTPATIENT
Start: 2019-08-09 | End: 2019-08-09 | Stop reason: HOSPADM

## 2019-08-09 RX ORDER — SODIUM CHLORIDE 0.9 % (FLUSH) 0.9 %
5-40 SYRINGE (ML) INJECTION AS NEEDED
Status: DISCONTINUED | OUTPATIENT
Start: 2019-08-09 | End: 2019-08-09 | Stop reason: HOSPADM

## 2019-08-09 RX ORDER — SODIUM CHLORIDE 9 MG/ML
50 INJECTION, SOLUTION INTRAVENOUS CONTINUOUS
Status: DISCONTINUED | OUTPATIENT
Start: 2019-08-09 | End: 2019-08-09 | Stop reason: HOSPADM

## 2019-08-09 RX ORDER — ATROPINE SULFATE 0.1 MG/ML
0.5 INJECTION INTRAVENOUS
Status: DISCONTINUED | OUTPATIENT
Start: 2019-08-09 | End: 2019-08-09 | Stop reason: HOSPADM

## 2019-08-09 RX ORDER — ACETAMINOPHEN 325 MG/1
TABLET ORAL
COMMUNITY

## 2019-08-09 RX ORDER — SODIUM CHLORIDE 9 MG/ML
INJECTION, SOLUTION INTRAVENOUS
Status: DISCONTINUED | OUTPATIENT
Start: 2019-08-09 | End: 2019-08-09 | Stop reason: HOSPADM

## 2019-08-09 RX ORDER — FLUMAZENIL 0.1 MG/ML
0.2 INJECTION INTRAVENOUS
Status: DISCONTINUED | OUTPATIENT
Start: 2019-08-09 | End: 2019-08-09 | Stop reason: HOSPADM

## 2019-08-09 RX ADMIN — PROPOFOL 100 MG: 10 INJECTION, EMULSION INTRAVENOUS at 09:48

## 2019-08-09 RX ADMIN — SODIUM CHLORIDE: 900 INJECTION, SOLUTION INTRAVENOUS at 09:40

## 2019-08-09 RX ADMIN — PROPOFOL 20 MG: 10 INJECTION, EMULSION INTRAVENOUS at 09:58

## 2019-08-09 RX ADMIN — PROPOFOL 10 MG: 10 INJECTION, EMULSION INTRAVENOUS at 09:56

## 2019-08-09 RX ADMIN — PROPOFOL 20 MG: 10 INJECTION, EMULSION INTRAVENOUS at 09:50

## 2019-08-09 RX ADMIN — PROPOFOL 20 MG: 10 INJECTION, EMULSION INTRAVENOUS at 10:08

## 2019-08-09 RX ADMIN — PROPOFOL 20 MG: 10 INJECTION, EMULSION INTRAVENOUS at 10:01

## 2019-08-09 RX ADMIN — PROPOFOL 10 MG: 10 INJECTION, EMULSION INTRAVENOUS at 09:54

## 2019-08-09 RX ADMIN — PROPOFOL 20 MG: 10 INJECTION, EMULSION INTRAVENOUS at 10:11

## 2019-08-09 RX ADMIN — PROPOFOL 30 MG: 10 INJECTION, EMULSION INTRAVENOUS at 09:53

## 2019-08-09 RX ADMIN — PROPOFOL 20 MG: 10 INJECTION, EMULSION INTRAVENOUS at 10:05

## 2019-08-09 NOTE — DISCHARGE INSTRUCTIONS
[address] [facility].  [address]  [id number]  1950    COLON DISCHARGE INSTRUCTIONS    DISCOMFORT:  Redness at IV site- apply warm compress to area; if redness or soreness persist- contact your physician  There may be a slight amount of blood passed from the rectum  Gaseous discomfort- walking, belching will help relieve any discomfort  You may not operate a vehicle for 12 hours  You may not  engage in an occupation involving machinery or appliances for rest of today  You may not  drink alcoholic beverages for at least 12 hours  Avoid making any critical decisions for at least 24 hour    DIET:   High fiber diet. - however -  remember your colon is empty and a heavy meal will produce gas. Avoid these foods:  vegetables, fried / greasy foods, carbonated drinks for today     ACTIVITY:  It is recommended that you spend the remainder of the day resting -  avoid any strenuous activity. CALL M.D. ANY SIGN OF:   Increasing pain, nausea, vomiting  Abdominal distension (swelling)  New increased bleeding (oral or rectal)  Fever (chills)  Pain in chest area  Bloody discharge from nose or mouth  Shortness of breath    You may not  take any Advil, Aspirin, Ibuprofen, Motrin, Aleve, or Goodys for 7 days, ONLY  Tylenol as needed for pain. Post procedure diagnosis: Diverticulosis  Colon Polyps- 2 Splenic Flexure, 1 Descending Colon      Follow-up Instructions:    Call Dr. Rupinder Rivera for any questions or problems. If we took a biopsy please call the office within 2 weeks to discuss your  pathology results. Telephone # [phone]     Patient Education   Patient Education        Diverticulosis: Care Instructions  Your Care Instructions  In diverticulosis, pouches called diverticula form in the wall of the large intestine (colon). The pouches do not cause any pain or other symptoms.  Most people who have diverticulosis do not know they have it. But the pouches sometimes bleed, and if they become infected, they can cause pain and other symptoms. When this happens, it is called diverticulitis. Diverticula form when pressure pushes the wall of the colon outward at certain weak points. A diet that is too low in fiber can cause diverticula. Follow-up care is a key part of your treatment and safety. Be sure to make and go to all appointments, and call your doctor if you are having problems. It's also a good idea to know your test results and keep a list of the medicines you take. How can you care for yourself at home? · Include fruits, leafy green vegetables, beans, and whole grains in your diet each day. These foods are high in fiber. · Take a fiber supplement, such as Citrucel or Metamucil, every day if needed. Read and follow all instructions on the label. · Drink plenty of fluids, enough so that your urine is light yellow or clear like water. If you have kidney, heart, or liver disease and have to limit fluids, talk with your doctor before you increase the amount of fluids you drink. · Get at least 30 minutes of exercise on most days of the week. Walking is a good choice. You also may want to do other activities, such as running, swimming, cycling, or playing tennis or team sports. · Cut out foods that cause gas, pain, or other symptoms. When should you call for help?   Call your doctor now or seek immediate medical care if:    · You have belly pain.     · You pass maroon or very bloody stools.     · You have a fever.     · You have nausea and vomiting.     · You have unusual changes in your bowel movements or abdominal swelling.     · You have burning pain when you urinate.     · You have abnormal vaginal discharge.     · You have shoulder pain.     · You have cramping pain that does not get better when you have a bowel movement or pass gas.     · You pass gas or stool from your urethra while urinating.    Watch closely for changes in your health, and be sure to contact your doctor if you have any problems. Where can you learn more? Go to http://daphne-marquise.info/. Enter S280 in the search box to learn more about \"Diverticulosis: Care Instructions. \"  Current as of: November 7, 2018  Content Version: 12.1  © 6623-5756 [employer]. Care instructions adapted under license by [employer] (which disclaims liability or warranty for this information). If you have questions about a medical condition or this instruction, always ask your healthcare professional. [address] any warranty or liability for your use of this information. Colon Polyps: Care Instructions  Your Care Instructions    Colon polyps are growths in the colon or the rectum. The cause of most colon polyps is not known, and most people who get them do not have any problems. But a certain kind can turn into cancer. For this reason, regular testing for colon polyps is important for people as they get older. It is also important for anyone who has an increased risk for colon cancer. Polyps are usually found through routine colon cancer screening tests. Although most colon polyps are not cancerous, they are usually removed and then tested for cancer. Screening for colon cancer saves lives because the cancer can usually be cured if it is caught early. If you have a polyp that is the type that can turn into cancer, you may need more tests to examine your entire colon. The doctor will remove any other polyps that he or she finds, and you will be tested more often. Follow-up care is a key part of your treatment and safety. Be sure to make and go to all appointments, and call your doctor if you are having problems. It's also a good idea to know your test results and keep a list of the medicines you take. How can you care for yourself at home?   Regular exams to look for colon polyps are the best way to prevent polyps from turning into colon cancer. These can include stool tests, sigmoidoscopy, colonoscopy, and CT colonography. Talk with your doctor about a testing schedule that is right for you. To prevent polyps  There is no home treatment that can prevent colon polyps. But these steps may help lower your risk for cancer. · Stay active. Being active can help you get to and stay at a healthy weight. Try to exercise on most days of the week. Walking is a good choice. · Eat well. Choose a variety of vegetables, fruits, legumes (such as peas and beans), fish, poultry, and whole grains. · Do not smoke. If you need help quitting, talk to your doctor about stop-smoking programs and medicines. These can increase your chances of quitting for good. · If you drink alcohol, limit how much you drink. Limit alcohol to 2 drinks a day for men and 1 drink a day for women. When should you call for help? Call your doctor now or seek immediate medical care if:    · You have severe belly pain.     · Your stools are maroon or very bloody.    Watch closely for changes in your health, and be sure to contact your doctor if:    · You have a fever.     · You have nausea or vomiting.     · You have a change in bowel habits (new constipation or diarrhea).     · Your symptoms get worse or are not improving as expected. Where can you learn more? Go to http://daphne-marquise.info/. Enter 95 [id number] in the search box to learn more about \"Colon Polyps: Care Instructions. \"  Current as of: December 19, 2018  Content Version: 12.1  © 5921-3105 [employer]. Care instructions adapted under license by [employer] (which disclaims liability or warranty for this information). If you have questions about a medical condition or this instruction, always ask your healthcare professional. [address] any warranty or liability for your use of this information.

## 2019-08-09 NOTE — PROCEDURES
118 [facility].  [address] [place] [address] E Vickie Arreguin, [address]  [phone]                              Colonoscopy Procedure Note      Indications:    Personal history of colon polyps (screening only)     :  Jackelin Hobson MD    Surgical Assistant: None    Implants: none    Referring Provider: Quinn Pineda MD    Sedation:  MAC anesthesia    Procedure Details:  After informed consent was obtained with all risks and benefits of procedure explained and preoperative exam completed, the patient was taken to the endoscopy suite and placed in the left lateral decubitus position. Upon sequential sedation as per above, a digital rectal exam was performed  And was normal.  The Olympus videocolonoscope  was inserted in the rectum and carefully advanced to the cecum, which was identified by the ileocecal valve and appendiceal orifice. The quality of preparation was good. The colonoscope was slowly withdrawn with careful evaluation between folds. Retroflexion in the rectum was performed and was normal..     Findings:   Rectum: no mucosal lesion appreciated  Grade 1 internal hemorrhoid(s); Sigmoid: no mucosal lesion appreciated      - Diverticulosis  Descending Colon: 1  Sessile polyp(s), the largest 6 mm in size;  Transverse Colon: no mucosal lesion appreciated  Ascending Colon: 1 small Sessile polyp and 1 flat polyp, the largest 15 mm (flat polyp) in size were noted at the hepatic flexure;   Cecum: no mucosal lesion appreciated  Terminal Ileum: not intubated    Interventions:  injection of 6 cc of Eleview was successful in lifting this lesion  1 complete polypectomy were performed using hot snare  and the polyps were  retrieved  1 piecemeal resection of the flat polyp at the hepatic flexure was performed using a Hexagonal hot snare and regular snare    Specimen Removed:    ID Type Source Tests Collected by Time Destination   1 : Hepatic Flexure Polyp Preservative   Nohemy Double MD DIAZ 8/9/2019 1008 Pathology   2 : Descending Colon Polyp Preservative   Mallory Bejarano MD 8/9/2019 1011 Pathology       Complications: None. EBL:  None. Recommendations: -Await pathology.  -High fiber diet. -Repeat colonoscopy in 1 year     -Resume normal medication(s). -NO aspirin for 7 days     Discharge Disposition:  Home in the company of a  when able to ambulate.     Jackelin Hobson MD  8/9/2019  10:27 AM

## 2019-08-09 NOTE — PROGRESS NOTES

## 2019-08-09 NOTE — ANESTHESIA PREPROCEDURE EVALUATION
Relevant Problems   No relevant active problems       Anesthetic History   No history of anesthetic complications            Review of Systems / Medical History  Patient summary reviewed, nursing notes reviewed and pertinent labs reviewed    Pulmonary            Asthma        Neuro/Psych   Within defined limits           Cardiovascular                  Exercise tolerance: >4 METS     GI/Hepatic/Renal     GERD           Endo/Other             Other Findings              Physical Exam    Airway  Mallampati: I  TM Distance: > 6 cm  Neck ROM: normal range of motion   Mouth opening: Normal     Cardiovascular    Rhythm: regular  Rate: normal         Dental  No notable dental hx       Pulmonary  Breath sounds clear to auscultation               Abdominal         Other Findings            Anesthetic Plan    ASA: 2            Induction: Intravenous  Anesthetic plan and risks discussed with: Patient

## 2019-08-09 NOTE — ROUTINE PROCESS
Sherman Moris  1950  [id number]    Situation:  Verbal report received from: Barbara Thomas  Procedure: Procedure(s):  COLONOSCOPY  INJECTION  ENDOSCOPIC POLYPECTOMY    Background:    Preoperative diagnosis: PERSONAL HX OF COLONIC POLYPS  Postoperative diagnosis: Diverticulosis  Colon Polyps- 2 Splenic Flexure, 1 Descending Colon    :  Dr. Jaun Castro  Assistant(s): Endoscopy Technician-1: Bettie Aly  Endoscopy RN-1: Telma Rausch RN    Specimens:   ID Type Source Tests Collected by Time Destination   1 : Hepatic Flexure Polyp Preservative   Kaila Lamb MD 8/9/2019 1008 Pathology   2 : Descending Colon Polyp Preservative   Kaila Lamb MD 8/9/2019 1011 Pathology     H. Pylori  no    Assessment:  Intra-procedure medications     Anesthesia gave intra-procedure sedation and medications, see anesthesia flow sheet yes    Intravenous fluids: NS@ KVO     Vital signs stable yes    Abdominal assessment: round and soft  Yes     Recommendation:  Discharge patient per MD order yse.   Return to floor na   Family or Friend fam  Permission to share finding with family or friend yes

## 2019-08-09 NOTE — H&P
[address]  217 [facility] 140 [facility], [address]  [phone]                                History and Physical     NAME: Hans Montejo   :  1950   MRN:  [id number]     HPI:  The patient was seen and examined. Date of last colonoscopy: 3 years, Polyps  Yes     Past Surgical History:   Procedure Laterality Date    HX BREAST BIOPSY Right     HX COLONOSCOPY  2016    Polypectomy Serrated Adenoma. f/u [place] 3 yrs    HX ENDOSCOPY  2016    Dilatation of Schatzki's Ring    HX GYN      C section x 3; Later, Laparoscopic Lysis of Adhesions    HX OTHER SURGICAL      csection     Past Medical History:   Diagnosis Date    Chronic insomnia     EBT (electron beam tomography) abnormal 2019    May 2013 . Coronary Calcium score 37, c/w mild Non obstructive Coronary artery plaque     Fever blister 2019    Foot fracture, left 2013    left 5th Metatarsal     GERD (gastroesophageal reflux disease)     Schatzki's ring , tx'd with EGD and dilatation.  Fullness in throat sensation attributed to Globus Pharyngeus     Hypercholesterolemia     Impingement syndrome of left shoulder 2019    Kidney stones, calcium oxalate 2019    Lithotripsy 2008    Mild acquired hearing loss     MIld b/l symmetrical high frequency hearing loss on hearing testing [facility]     Moderate asthma 2019    Osteopenia 2019    tx : Reclast     Other ill-defined conditions(799.89)     high cholesterol    Patellar dislocation     Partial dislocation , left knee      Social History     Tobacco Use    Smoking status: Never Smoker    Smokeless tobacco: Never Used   Substance Use Topics    Alcohol use: Yes     Comment: 2 to 3 glasses of wine or less per week     Drug use: Not on file     Allergies   Allergen Reactions    Other Medication Cough     Possibly to Flax seed Omega 3     Pcn [Penicillins] Rash     Family History   Problem Relation Age of Onset    Heart Disease Mother     Stroke Mother     Heart Disease Father     Elevated Lipids Father     Hypertension Father     Stroke Brother     Heart Disease Brother      Current Facility-Administered Medications   Medication Dose Route Frequency    0.9% sodium chloride infusion  50 mL/hr IntraVENous CONTINUOUS    sodium chloride (NS) flush 5-40 mL  5-40 mL IntraVENous Q8H    sodium chloride (NS) flush 5-40 mL  5-40 mL IntraVENous PRN    naloxone (NARCAN) injection 0.4 mg  0.4 mg IntraVENous Multiple    flumazenil (ROMAZICON) 0.1 mg/mL injection 0.2 mg  0.2 mg IntraVENous Multiple    simethicone (MYLICON) 61BR/0.5AL oral drops 80 mg  1.2 mL Oral Multiple    atropine injection 0.5 mg  0.5 mg IntraVENous ONCE PRN    EPINEPHrine (ADRENALIN) 0.1 mg/mL syringe 1 mg  1 mg Endoscopically ONCE PRN         PHYSICAL EXAM:  General: WD, WN. Alert, cooperative, no acute distress    HEENT: NC, Atraumatic. PERRLA, EOMI. Anicteric sclerae. Lungs:  CTA Bilaterally. No Wheezing/Rhonchi/Rales. Heart:  Regular  rhythm,  No murmur, No Rubs, No Gallops  Abdomen: Soft, Non distended, Non tender.  +Bowel sounds, no HSM  Extremities: No c/c/e  Neurologic:  CN 2-12 gi, Alert and oriented X 3. No acute neurological distress   Psych:   Good insight. Not anxious nor agitated. The heart, lungs and mental status were satisfactory for the administration of MAC sedation and for the procedure.       Mallampati score: 2       Assessment:   · Personal history colon polyp    Plan:   · Endoscopic procedure  · MAC sedation   ·   ·

## 2019-09-03 NOTE — ANESTHESIA POSTPROCEDURE EVALUATION
Post-Anesthesia Evaluation and Assessment    Patient: Flo Lopez MRN: [id number]  SSN: [id number]    YOB: 1950  Age: 81 y.o. Sex: female      I have evaluated the patient and they are stable and ready for discharge from the PACU. Cardiovascular Function/Vital Signs  Visit Vitals  /74   Pulse 72   Temp 36.6 °C (97.8 °F)   Resp 20   Wt 54.4 kg (120 lb)   SpO2 99%   BMI 23.44 kg/m²       Patient is status post MAC anesthesia for Procedure(s):  COLONOSCOPY  INJECTION  ENDOSCOPIC POLYPECTOMY. Nausea/Vomiting: None    Postoperative hydration reviewed and adequate. Pain:  Pain Scale 1: Numeric (0 - 10) (08/09/19 1035)  Pain Intensity 1: 0 (08/09/19 1035)   Managed    Neurological Status: At baseline    Mental Status, Level of Consciousness: Alert and  oriented to person, place, and time    Pulmonary Status:   O2 Device: Room air (08/09/19 1035)   Adequate oxygenation and airway patent    Complications related to anesthesia: None    Post-anesthesia assessment completed. No concerns    Signed By: Britni Bar MD     September 3, 2019              Procedure(s):  COLONOSCOPY  INJECTION  ENDOSCOPIC POLYPECTOMY. No value filed.     <BSHSIANPOST>    Vitals Value Taken Time   /74 8/9/2019 10:35 AM   Temp     Pulse 72 8/9/2019 10:35 AM   Resp 20 8/9/2019 10:35 AM   SpO2 99 % 8/9/2019 10:35 AM

## 2019-11-24 PROBLEM — R07.89 OTHER CHEST PAIN: Status: ACTIVE | Noted: 2019-11-24

## 2020-07-12 RX ORDER — ZOLEDRONIC ACID 5 MG/100ML
5 INJECTION, SOLUTION INTRAVENOUS ONCE
Status: COMPLETED | OUTPATIENT
Start: 2020-07-17 | End: 2020-07-17

## 2020-07-17 ENCOUNTER — HOSPITAL ENCOUNTER (OUTPATIENT)
Dept: INFUSION THERAPY | Age: 70
Discharge: HOME | End: 2020-07-17
Payer: COMMERCIAL

## 2020-07-17 VITALS
SYSTOLIC BLOOD PRESSURE: 132 MMHG | TEMPERATURE: 97.6 F | DIASTOLIC BLOOD PRESSURE: 56 MMHG | HEART RATE: 86 BPM | RESPIRATION RATE: 18 BRPM

## 2020-07-17 PROCEDURE — 96374 THER/PROPH/DIAG INJ IV PUSH: CPT

## 2020-07-17 PROCEDURE — 74011250636 HC RX REV CODE- 250/636: Performed by: INTERNAL MEDICINE

## 2020-07-17 RX ADMIN — ZOLEDRONIC ACID 5 MG: 5 SOLUTION INTRAVENOUS at 17:09

## 2020-07-17 NOTE — PROGRESS NOTES
[facility] Note:  [id number] arrived at [facility] ambulatory and in no distress for yearly reclast..    Assessment stable, no new complaints voiced. Medications received:  Medications Administered     zoledronic acid (RECLAST) IVPB 5 mg     Admin Date  07/17/2020 Action  Given Dose  5 mg Rate  400 mL/hr Route  IntraVENous Administered By  Juani Ordonez, RN                  1740 Tolerated treatment well, no adverse reaction noted. D/Cd from [facility] ambulatory and in no distress accompanied by self. Next appt to be made  Visit Vitals  /56   Pulse 86   Temp 97.6 °F (36.4 °C)   Resp 18     No results found for this or any previous visit (from the past 12 hour(s)). Labs were done prior to.

## 2021-03-27 PROBLEM — G45.9 TIA (TRANSIENT ISCHEMIC ATTACK): Status: ACTIVE | Noted: 2021-03-01

## 2021-04-09 PROBLEM — I63.9 STROKE (CEREBRUM) (HCC): Status: ACTIVE | Noted: 2021-04-09

## 2021-04-09 PROBLEM — G45.3 AMAUROSIS FUGAX OF RIGHT EYE: Status: ACTIVE | Noted: 2021-04-09

## 2021-09-16 ENCOUNTER — HOSPITAL ENCOUNTER (OUTPATIENT)
Dept: GENERAL RADIOLOGY | Age: 71
Discharge: HOME | End: 2021-09-16
Payer: MEDICARE

## 2021-09-16 DIAGNOSIS — M25.572 ACUTE LEFT ANKLE PAIN: ICD-10-CM

## 2021-09-16 PROCEDURE — 73610 X-RAY EXAM OF ANKLE: CPT

## 2021-12-10 ENCOUNTER — OFFICE VISIT (OUTPATIENT)
Dept: ORTHOPEDIC SURGERY | Age: 71
End: 2021-12-10
Payer: MEDICARE

## 2021-12-10 VITALS — BODY MASS INDEX: 28.66 KG/M2 | HEIGHT: 60 IN | WEIGHT: 146 LBS

## 2021-12-10 DIAGNOSIS — M25.572 PAIN AND SWELLING OF LEFT ANKLE: ICD-10-CM

## 2021-12-10 DIAGNOSIS — S93.402A MODERATE LEFT ANKLE SPRAIN, INITIAL ENCOUNTER: Primary | ICD-10-CM

## 2021-12-10 DIAGNOSIS — M25.472 PAIN AND SWELLING OF LEFT ANKLE: ICD-10-CM

## 2021-12-10 PROCEDURE — 1101F PT FALLS ASSESS-DOCD LE1/YR: CPT | Performed by: ORTHOPAEDIC SURGERY

## 2021-12-10 PROCEDURE — G8419 CALC BMI OUT NRM PARAM NOF/U: HCPCS | Performed by: ORTHOPAEDIC SURGERY

## 2021-12-10 PROCEDURE — 3017F COLORECTAL CA SCREEN DOC REV: CPT | Performed by: ORTHOPAEDIC SURGERY

## 2021-12-10 PROCEDURE — 1090F PRES/ABSN URINE INCON ASSESS: CPT | Performed by: ORTHOPAEDIC SURGERY

## 2021-12-10 PROCEDURE — G8510 SCR DEP NEG, NO PLAN REQD: HCPCS | Performed by: ORTHOPAEDIC SURGERY

## 2021-12-10 PROCEDURE — 99213 OFFICE O/P EST LOW 20 MIN: CPT | Performed by: ORTHOPAEDIC SURGERY

## 2021-12-10 PROCEDURE — G8427 DOCREV CUR MEDS BY ELIG CLIN: HCPCS | Performed by: ORTHOPAEDIC SURGERY

## 2021-12-10 PROCEDURE — G9899 SCRN MAM PERF RSLTS DOC: HCPCS | Performed by: ORTHOPAEDIC SURGERY

## 2021-12-10 PROCEDURE — G8536 NO DOC ELDER MAL SCRN: HCPCS | Performed by: ORTHOPAEDIC SURGERY

## 2021-12-10 RX ORDER — METHYLPREDNISOLONE 4 MG/1
TABLET ORAL
Qty: 1 DOSE PACK | Refills: 0 | Status: SHIPPED | OUTPATIENT
Start: 2021-12-10 | End: 2021-12-22

## 2021-12-10 NOTE — PROGRESS NOTES
Nahun Palacios (: 1950) is a 73 y.o. female, patient,here for evaluation of the following   Chief Complaint   Patient presents with    Ankle Injury     left ankle injury 2 days ago. slipped down 2 stairs. went to [facility], where x-rays were taken. she was diagnosed with a sprain and placed in a boot        ASSESSMENT/PLAN:  Below is the assessment and plan developed based on review of pertinent history, physical exam, labs, studies, and medications. 1. Moderate left ankle sprain, initial encounter  -     methylPREDNISolone (MEDROL DOSEPACK) 4 mg tablet; Take 1 Tablet by mouth Specific Days and Specific Times for 6 days, THEN 1 Tablet Specific Days and Specific Times for 6 days. As instructed on packet, Normal, Disp-1 Dose Pack, R-0  -     MRI ANKLE LT WO CONT; Future  2. Pain and swelling of left ankle  -     methylPREDNISolone (MEDROL DOSEPACK) 4 mg tablet; Take 1 Tablet by mouth Specific Days and Specific Times for 6 days, THEN 1 Tablet Specific Days and Specific Times for 6 days. As instructed on packet, Normal, Disp-1 Dose Pack, R-0  -     MRI ANKLE LT WO CONT; Future      Patient has persistent pain since injury, she has been going through physical therapy without good result. I provided some medications for anti-inflammatory treatment and may also help her pain. Since she is not improving with conservative treatment which includes physical therapy, I recommended MRI without contrast left ankle. Return for Will return 2022, will call with MRI results. .      Allergies   Allergen Reactions    Alendronate Other (comments)     Throat irritation   Other reaction(s): Other (see comments)  Throat irritation     Boniva [Ibandronate] Other (comments)     Throat irritation    Chlorhexidine Itching    Other Medication Cough     Possibly to Flax seed Omega 3     Penicillins Rash     Other reaction(s):  Other       Current Outpatient Medications   Medication Sig    methylPREDNISolone (MEDROL DOSEPACK) 4 mg tablet Take 1 Tablet by mouth Specific Days and Specific Times for 6 days, THEN 1 Tablet Specific Days and Specific Times for 6 days. As instructed on packet    rosuvastatin (Crestor) 40 mg tablet Take 40 mg by mouth nightly.  pantoprazole (Protonix) 40 mg tablet Take 40 mg by mouth daily.  melatonin 10 mg tab Take  by mouth nightly.  diazePAM (VALIUM) 5 mg tablet TAKE 1 TO 2 TABLETS BY MOUTH AT BEDTIME AS NEEDED    clopidogreL (Plavix) 75 mg tab Take 75 mg by mouth.  albuterol (Ventolin HFA) 90 mcg/actuation inhaler Take 2 Puffs by inhalation every four (4) hours as needed for Wheezing.  mometasone (Asmanex HFA) 200 mcg/actuation HFAA inhaler Use 1 to 2 puffs once or twice daily as directed    valACYclovir (VALTREX) 1 gram tablet Take 1 Tab by mouth three (3) times daily. Call pt on her Cell phone to see if she wants it delivered    Lactobacillus rhamnosus GG (CULTURELLE PO) Take  by mouth.  acetaminophen (TYLENOL) 325 mg tablet Take  by mouth every four (4) hours as needed for Pain.  valACYclovir (VALTREX) 1 gram tablet Take 1,000 mg by mouth three (3) times daily.  metroNIDAZOLE (METROCREAM) 0.75 % topical cream Apply  to affected area two (2) times a day. Use a thin layer to affected areas after washing    camphor-menthol (SARNA ANTI-ITCH) 0.5-0.5 % lotion Apply  to affected area as needed for Itching.  cetirizine (ZYRTEC) 10 mg tablet Take 20 mg by mouth.  clindamycin phosphate 1 % swab by Apply Externally route.  albuterol (PROVENTIL HFA) 90 mcg/actuation inhaler Take  by inhalation. No current facility-administered medications for this visit. Past Medical History:   Diagnosis Date    Amaurosis fugax of right eye     3/29/21 , eval'd by Dr. Lori Araya and later by Dr. Andre Lucio     Chronic insomnia     DDD (degenerative disc disease), lumbar 01/11/2019     Xray Lumbar spine: Mild generalized Osteopenia.  Mod DDD L5-S1.mild degenerative change in rest Lumbar Spine. very minimal left lumbar scoliosis.  EBT (electron beam tomography) abnormal 02/09/2019    May 2013 . Coronary Calcium score 37, c/w mild Non obstructive Coronary artery plaque. 2019  [facility]: no significant CAD     Fever blister 2/9/2019    Foot fracture, left 09/2013    left 5th Metatarsal     GERD (gastroesophageal reflux disease)     Schatzki's ring , tx'd with EGD and dilatation. Fullness in throat sensation attributed to Globus Pharyngeus     Hypercholesterolemia     Impingement syndrome of left shoulder 2/9/2019    Kidney stones, calcium oxalate 2/9/2019    Lithotripsy 2008    Left knee DJD 10/17/2017    Xray . Mild degenerative changes left knee. Osteopenia    Mild acquired hearing loss 2018    MIld b/l symmetrical high frequency hearing loss on hearing testing at [facility]     Mitral regurgitation 10/10/2019    [facility]: EF 60-65 %. Trivial MR. mild diastolic dysfunction    Moderate asthma 2/9/2019    Osteopenia 2/9/2019    tx : Reclast     Osteoporosis 09/22/2021    Radius  T score -2.2  L spine T -1.4 . Total hip T -1.5 ; femoral neck T -1.7 . [facility] Dr. Katharina Bowen Endocrin[facility]     Other chest pain 11/24/2019 11/19/19 Coronary angiogram: by Dr. Obed Yan MD , [facility];. No significant coronary disease. Right Dominant Circulation. Normal Left sided filling pressures. Echo: EF. 60-65% Borderline Mitral Valve Prolapse. Trivial MR. No AS/AI. Mild Diastolic Dysfunction    Other ill-defined conditions(799.89)     high cholesterol    Patellar dislocation     Partial dislocation , left knee     Stroke (cerebrum) ([facility] [address]     3/21/2021 : right facial droop, associated Transient Global Amnesia. eval'd by Dr. Álvaro Waller and later by Dr. Catalina Martinez     Thyroid disease     in her 25s took thyroid medication briefly.         Past Surgical History:   Procedure Laterality Date    COLONOSCOPY N/A 8/9/2019    COLONOSCOPY performed by Modesto Pereyra MD at [address] [place] [address] ENDOSCOPY. Tubular Adenoma and Serrated Adenoma. f/u 1 yr. Internal Hemorrhoids. Diverticuli . 2 polypectomies. f/u colonoscopy in 1 y     HX BREAST BIOPSY Right     HX COLONOSCOPY  07/29/2016    Polypectomy Serrated Adenoma. f/u [place] 3 yrs    HX COLONOSCOPY  08/25/2020    Diverticulosis . f/u 3 years Dr. Dimitri Valencia HX ENDOSCOPY  07/29/2016    Dilatation of Schatzki's Ring    HX GYN      C section x 3; Later, Laparoscopic Lysis of Adhesions    HX OTHER SURGICAL      csection       Family History   Problem Relation Age of Onset    Heart Disease Mother     Stroke Mother     Heart Disease Father     Elevated Lipids Father     Hypertension Father     Stroke Brother     Heart Disease Brother        Social History     Socioeconomic History    Marital status:      Spouse name: Not on file    Number of children: Not on file    Years of education: Not on file    Highest education level: Not on file   Occupational History    Not on file   Tobacco Use    Smoking status: Never Smoker    Smokeless tobacco: Never Used   Substance and Sexual Activity    Alcohol use: Yes     Comment: 2 to 3 glasses of wine or less per week     Drug use: Not on file    Sexual activity: Not on file   Other Topics Concern    Not on file   Social History Narrative    Not on file     Social Determinants of Health     Financial Resource Strain:     Difficulty of Paying Living Expenses: Not on file   Food Insecurity:     Worried About Running Out of Food in the Last Year: Not on file    Edgar of Food in the Last Year: Not on file   Transportation Needs:     Lack of Transportation (Medical): Not on file    Lack of Transportation (Non-Medical):  Not on file   Physical Activity:     Days of Exercise per Week: Not on file    Minutes of Exercise per Session: Not on file   Stress:     Feeling of Stress : Not on file   Social Connections:     Frequency of Communication with Friends and Family: Not on file    Frequency of Social Gatherings with Friends and Family: Not on file    Attends Mormonism Services: Not on file    Active Member of Clubs or Organizations: Not on file    Attends Club or Organization Meetings: Not on file    Marital Status: Not on file   Intimate Partner Violence:     Fear of Current or Ex-Partner: Not on file    Emotionally Abused: Not on file    Physically Abused: Not on file    Sexually Abused: Not on file   Housing Stability:     Unable to Pay for Housing in the Last Year: Not on file    Number of [place] in the Last Year: Not on file    Unstable Housing in the Last Year: Not on file           Vitals:  Ht 5' (1.524 m)   Wt 146 lb (66.2 kg)   BMI 28.51 kg/m²    Body mass index is 28.51 kg/m². SUBJECTIVE/OBJECTIVE:  Lyubov Human (: 1950)   Former patient returns today for new problem. In the past I have seen her last on 2021 for right ankle arthritis. Today it is unrelated to the right ankle, she recently slipped down 2 steps in her house and injured the left ankle. She was seen at [facility] and told she has an ankle sprain. She was referred to physical therapy and still having trouble with pain. She also has noted a lot more swelling. She denies any other areas of pain. She is not diabetic, non-smoker. Physical Exam  Pleasant well-nourished female , alert and oriented to person, time and place, no acute distress. Antalgic gait, limited weightbearing stance. Left ankle: There is no malalignment or deformity, there is tenderness and swelling to the anterior lateral and medial ankle, there is a negative Ocasio test, negative ankle squeeze test.  Ligaments are grossly intact although difficult to examine due to pain. No open wounds or lesions. Left foot: There is no tenderness to palpation around the foot, no significant swelling, ligaments are grossly stable.   Able to flex and extend toes satisfactory range of motion and strength. Contralateral foot and ankle exam, nontender, no swelling ligaments grossly stable. Normal weightbearing stance. Neurovascular exam intact for light touch sensation, cap refill, dorsalis pedis pulse palpable, flexion/extension strength 5/5. Skin intact without open wounds, lesions or ulcers, no skin discolorations, normal warmth to skin. Imaging:     XR Results (most recent):  Results from Hospital Encounter encounter on 09/16/21    XR ANKLE LT MIN 3 V    Narrative  EXAM: XR ANKLE LT MIN 3 V    INDICATION: Acute left ankle pain. COMPARISON: None. FINDINGS: Three views of the left ankle demonstrate a small ossification  adjacent to the medial malleolus more likely representing remote fracture or  accessory ossicle. There is no convincing acute fracture. There is no widening  of the mortise. There is mild osteoarthritis. Impression  No convincing acute fracture as discussed. An electronic signature was used to authenticate this note.   -- Edith Cheng MD

## 2021-12-21 ENCOUNTER — HOSPITAL ENCOUNTER (OUTPATIENT)
Dept: MRI IMAGING | Age: 71
Discharge: HOME | End: 2021-12-21
Attending: ORTHOPAEDIC SURGERY
Payer: MEDICARE

## 2021-12-21 DIAGNOSIS — M25.572 PAIN AND SWELLING OF LEFT ANKLE: ICD-10-CM

## 2021-12-21 DIAGNOSIS — S93.402A MODERATE LEFT ANKLE SPRAIN, INITIAL ENCOUNTER: ICD-10-CM

## 2021-12-21 DIAGNOSIS — M25.472 PAIN AND SWELLING OF LEFT ANKLE: ICD-10-CM

## 2021-12-21 PROCEDURE — 73721 MRI JNT OF LWR EXTRE W/O DYE: CPT

## 2022-01-06 ENCOUNTER — OFFICE VISIT (OUTPATIENT)
Dept: ORTHOPEDIC SURGERY | Age: 72
End: 2022-01-06
Payer: MEDICARE

## 2022-01-06 DIAGNOSIS — S93.402A SEVERE SPRAIN OF LEFT ANKLE, INITIAL ENCOUNTER: Primary | ICD-10-CM

## 2022-01-06 PROCEDURE — G8432 DEP SCR NOT DOC, RNG: HCPCS | Performed by: ORTHOPAEDIC SURGERY

## 2022-01-06 PROCEDURE — 99212 OFFICE O/P EST SF 10 MIN: CPT | Performed by: ORTHOPAEDIC SURGERY

## 2022-01-06 PROCEDURE — G9899 SCRN MAM PERF RSLTS DOC: HCPCS | Performed by: ORTHOPAEDIC SURGERY

## 2022-01-06 PROCEDURE — G8536 NO DOC ELDER MAL SCRN: HCPCS | Performed by: ORTHOPAEDIC SURGERY

## 2022-01-06 PROCEDURE — G8427 DOCREV CUR MEDS BY ELIG CLIN: HCPCS | Performed by: ORTHOPAEDIC SURGERY

## 2022-01-06 PROCEDURE — 3017F COLORECTAL CA SCREEN DOC REV: CPT | Performed by: ORTHOPAEDIC SURGERY

## 2022-01-06 PROCEDURE — 1090F PRES/ABSN URINE INCON ASSESS: CPT | Performed by: ORTHOPAEDIC SURGERY

## 2022-01-06 PROCEDURE — 1101F PT FALLS ASSESS-DOCD LE1/YR: CPT | Performed by: ORTHOPAEDIC SURGERY

## 2022-01-06 PROCEDURE — G8419 CALC BMI OUT NRM PARAM NOF/U: HCPCS | Performed by: ORTHOPAEDIC SURGERY

## 2022-01-06 NOTE — PROGRESS NOTES
Kari London (: 1950) is a 73 y.o. female, patient,here for evaluation of the following   Chief Complaint   Patient presents with    Follow-up     Lt foot MRI         ASSESSMENT/PLAN:  Below is the assessment and plan developed based on review of pertinent history, physical exam, labs, studies, and medications. 1. Severe sprain of left ankle, initial encounter      Patient has history of moderate left ankle sprain and this was an injury with significant persistent swelling and more pain so referred for an MRI without contrast.  The left ankle MRI has been completed, this has been reviewed with the patient and she feels she has improved since last seen so she will continue with the current treatments that were implemented which included physical therapy program and use of anti-inflammatory medications when needed. If her symptoms do not further improve after completing therapy or there is still residual problems, then she may be a candidate for exploratory ankle arthroscopic procedure to evaluate the joint but also to evaluate for instability as the MRI does show the possibility of injury to the lateral ligaments of ankle that is more severe as it also includes the higher ligaments. She understands this and will return as needed if persistent symptoms and problems of right ankle pain or swelling. Further discussion about treatment will be discussed if she does return in the future and we may repeat an x-ray of the right ankle 3 views weightbearing compared to contralateral ankle on AP view, we will also consider stress x-rays of the left ankle compared to contralateral, if she returns. Return if symptoms worsen or fail to improve. Allergies   Allergen Reactions    Alendronate Other (comments)     Throat irritation   Other reaction(s):  Other (see comments)  Throat irritation     Boniva [Ibandronate] Other (comments)     Throat irritation    Chlorhexidine Itching    Other Medication Cough Possibly to Flax seed Omega 3     Penicillins Rash     Other reaction(s): Other       Current Outpatient Medications   Medication Sig    rosuvastatin (Crestor) 40 mg tablet Take 40 mg by mouth nightly.  pantoprazole (Protonix) 40 mg tablet Take 40 mg by mouth daily.  melatonin 10 mg tab Take  by mouth nightly.  diazePAM (VALIUM) 5 mg tablet TAKE 1 TO 2 TABLETS BY MOUTH AT BEDTIME AS NEEDED    clopidogreL (Plavix) 75 mg tab Take 75 mg by mouth.  albuterol (Ventolin HFA) 90 mcg/actuation inhaler Take 2 Puffs by inhalation every four (4) hours as needed for Wheezing.  mometasone (Asmanex HFA) 200 mcg/actuation HFAA inhaler Use 1 to 2 puffs once or twice daily as directed    Lactobacillus rhamnosus GG (CULTURELLE PO) Take  by mouth.  acetaminophen (TYLENOL) 325 mg tablet Take  by mouth every four (4) hours as needed for Pain.  valACYclovir (VALTREX) 1 gram tablet Take 1,000 mg by mouth as needed.  cetirizine (ZYRTEC) 10 mg tablet Take 20 mg by mouth.  albuterol (PROVENTIL HFA) 90 mcg/actuation inhaler Take  by inhalation.  metroNIDAZOLE (METROCREAM) 0.75 % topical cream Apply  to affected area two (2) times a day. Use a thin layer to affected areas after washing (Patient not taking: Reported on 1/6/2022)    clindamycin phosphate 1 % swab by Apply Externally route. (Patient not taking: Reported on 1/6/2022)     No current facility-administered medications for this visit. Past Medical History:   Diagnosis Date    Amaurosis fugax of right eye     3/29/21 , eval'd by Dr. Veleria Hamman and later by Dr. Kerry Hernandez     Chronic insomnia     DDD (degenerative disc disease), lumbar 01/11/2019     Xray Lumbar spine: Mild generalized Osteopenia. Mod DDD L5-S1.mild degenerative change in rest Lumbar Spine. very minimal left lumbar scoliosis.  EBT (electron beam tomography) abnormal 02/09/2019    May 2013 . Coronary Calcium score 37, c/w mild Non obstructive Coronary artery plaque.  2019 [facility]: no significant CAD     Fever blister 2/9/2019    Foot fracture, left 09/2013    left 5th Metatarsal     GERD (gastroesophageal reflux disease)     Schatzki's ring , tx'd with EGD and dilatation. Fullness in throat sensation attributed to Globus Pharyngeus     Hypercholesterolemia     Impingement syndrome of left shoulder 2/9/2019    Kidney stones, calcium oxalate 2/9/2019    Lithotripsy 2008    Left knee DJD 10/17/2017    Xray . Mild degenerative changes left knee. Osteopenia    Mild acquired hearing loss 2018    MIld b/l symmetrical high frequency hearing loss on hearing testing at [facility]     Mitral regurgitation 10/10/2019    [facility]: EF 60-65 %. Trivial MR. mild diastolic dysfunction    Moderate asthma 2/9/2019    Osteopenia 2/9/2019    tx : Reclast     Osteoporosis 09/22/2021    Radius  T score -2.2  L spine T -1.4 . Total hip T -1.5 ; femoral neck T -1.7 . [facility] Dr. Angelita Styles [facility]     Other chest pain 11/24/2019 11/19/19 Coronary angiogram: by Dr. Pritesh Lopez MD , [facility];. No significant coronary disease. Right Dominant Circulation. Normal Left sided filling pressures. Echo: EF. 60-65% Borderline Mitral Valve Prolapse. Trivial MR. No AS/AI. Mild Diastolic Dysfunction    Other ill-defined conditions(799.89)     high cholesterol    Patellar dislocation     Partial dislocation , left knee     Stroke (cerebrum) ([facility] [address]     3/21/2021 : right facial droop, associated Transient Global Amnesia. eval'd by Dr. Valeria Rubio and later by Dr. Kerry Hernandez     Thyroid disease     in her 25s took thyroid medication briefly. Past Surgical History:   Procedure Laterality Date    COLONOSCOPY N/A 8/9/2019    COLONOSCOPY performed by Saima Tuttle MD at [facility] ENDOSCOPY. Tubular Adenoma and Serrated Adenoma. f/u 1 yr. Internal Hemorrhoids. Diverticuli . 2 polypectomies.  f/u colonoscopy in 1 y     HX BREAST BIOPSY Right     HX COLONOSCOPY  07/29/2016 Polypectomy Serrated Adenoma. f/u Luis 3 yrs    HX COLONOSCOPY  08/25/2020    Diverticulosis . f/u 3 years Dr. Chip Ferreira HX ENDOSCOPY  07/29/2016    Dilatation of Schatzki's Ring    HX GYN      C section x 3; Later, Laparoscopic Lysis of Adhesions    HX OTHER SURGICAL      csection       Family History   Problem Relation Age of Onset    Heart Disease Mother     Stroke Mother     Heart Disease Father     Elevated Lipids Father     Hypertension Father     Stroke Brother     Heart Disease Brother        Social History     Socioeconomic History    Marital status:      Spouse name: Not on file    Number of children: Not on file    Years of education: Not on file    Highest education level: Not on file   Occupational History    Not on file   Tobacco Use    Smoking status: Never Smoker    Smokeless tobacco: Never Used   Substance and Sexual Activity    Alcohol use: Yes     Comment: 2 to 3 glasses of wine or less per week     Drug use: Not on file    Sexual activity: Not on file   Other Topics Concern    Not on file   Social History Narrative    Not on file     Social Determinants of Health     Financial Resource Strain:     Difficulty of Paying Living Expenses: Not on file   Food Insecurity:     Worried About Running Out of Food in the Last Year: Not on file    Edgar of Food in the Last Year: Not on file   Transportation Needs:     Lack of Transportation (Medical): Not on file    Lack of Transportation (Non-Medical):  Not on file   Physical Activity:     Days of Exercise per Week: Not on file    Minutes of Exercise per Session: Not on file   Stress:     Feeling of Stress : Not on file   Social Connections:     Frequency of Communication with Friends and Family: Not on file    Frequency of Social Gatherings with Friends and Family: Not on file    Attends Evangelical Services: Not on file    Active Member of Clubs or Organizations: Not on file    Attends Club or Organization Meetings: Not on file    Marital Status: Not on file   Intimate Partner Violence:     Fear of Current or Ex-Partner: Not on file    Emotionally Abused: Not on file    Physically Abused: Not on file    Sexually Abused: Not on file   Housing Stability:     Unable to Pay for Housing in the Last Year: Not on file    Number of Jicrismowyatt in the Last Year: Not on file    Unstable Housing in the Last Year: Not on file           Vitals: There were no vitals taken for this visit. There is no height or weight on file to calculate BMI. SUBJECTIVE/OBJECTIVE:  Yumiko Carmichael (: 1950)   Patient back to review the MRI results in person, I had called the patient with the results but she did not answer so I recommended she return to see me. She has no new complaints except she is doing better since going through physical therapy program and time to recover. She has less symptoms overall. Patient had an injury when she slipped down 2 steps in her house injuring the left ankle. I had also seen her in the past for the right lower extremity related to right ankle arthritis and she was last seen for that 2021. She is doing okay otherwise. She is not diabetic, non-smoker. Physical Exam  Pleasant well-nourished female , alert and oriented to person, time and place, no acute distress. Near normal gait, satisfactory weightbearing stance. Left ankle: There is less swelling and tenderness to palpation. No malalignment or deformities, anterior drawer and lateral talar tilt stress is not severely unstable. The area of syndesmosis is minimally there. Negative ankle squeeze test.    Left foot: No malalignment or deformities, nontender. No swelling. Contralateral foot and ankle exam, nontender, no swelling ligaments grossly stable. Normal weightbearing stance.     Neurovascular exam intact for light touch sensation, cap refill, dorsalis pedis pulse palpable, flexion/extension strength 5/5.    Skin intact without open wounds, lesions or ulcers, no skin discolorations, normal warmth to skin. Imaging:    Did not repeat x-rays today as they have been normal x-rays. The MRI without contrast dated December 21, 2021 is reviewed with patient. She is informed there is a significant amount of ligament injuries related to this injury and likely reason for persistent pain. There may be a little bit of instability still present based on findings. Detailed report of left ankle MRI is in chart. The radiologist impression is below. IMPRESSION  1. Subtle capsular avulsion at the anterior margin of the distal tibia. Disruption of anterior talofibular and calcaneofibular ligaments and chronic  appearing sprain of anterior distal tibiofibular ligament. 2. Subtle anterior subluxation of the talus with varus. 3. Subcortical reactive bone signal at the anterior and anteromedial aspect of  the tibial plafond and in the dorsal middle third of the talus. 4. Mild osteoarthrosis of middle subtalar, talonavicular and third-fifth TMT  articulations. 4. Mild peroneal, posterior tibialis and flexor digitorum longus tenosynovitis. Achilles peritendinitis. An electronic signature was used to authenticate this note.   -- Carlie Bustos MD

## 2022-03-18 PROBLEM — N20.0 KIDNEY STONES, CALCIUM OXALATE: Status: ACTIVE | Noted: 2019-02-09

## 2022-03-18 PROBLEM — R94.39 EBT (ELECTRON BEAM TOMOGRAPHY) ABNORMAL: Status: ACTIVE | Noted: 2019-02-09

## 2022-03-18 PROBLEM — G45.9 TIA (TRANSIENT ISCHEMIC ATTACK): Status: ACTIVE | Noted: 2021-03-01

## 2022-03-18 PROBLEM — R07.89 OTHER CHEST PAIN: Status: ACTIVE | Noted: 2019-11-24

## 2022-03-18 PROBLEM — M75.42 IMPINGEMENT SYNDROME OF LEFT SHOULDER: Status: ACTIVE | Noted: 2019-02-09

## 2022-03-19 PROBLEM — F51.04 CHRONIC INSOMNIA: Status: ACTIVE | Noted: 2019-02-09

## 2022-03-19 PROBLEM — G45.3 AMAUROSIS FUGAX OF RIGHT EYE: Status: ACTIVE | Noted: 2021-04-09

## 2022-03-19 PROBLEM — J45.909 MODERATE ASTHMA: Status: ACTIVE | Noted: 2019-02-09

## 2022-03-19 PROBLEM — M85.80 OSTEOPENIA: Status: ACTIVE | Noted: 2019-02-09

## 2022-03-19 PROBLEM — K58.0 IRRITABLE BOWEL SYNDROME WITH DIARRHEA: Status: ACTIVE | Noted: 2018-04-26

## 2022-03-19 PROBLEM — L13.0 DERMATITIS HERPETIFORMIS: Status: ACTIVE | Noted: 2018-09-20

## 2022-03-19 PROBLEM — M81.0 POST-MENOPAUSAL OSTEOPOROSIS: Status: ACTIVE | Noted: 2019-06-12

## 2022-03-19 PROBLEM — I63.9 STROKE (CEREBRUM) (HCC): Status: ACTIVE | Noted: 2021-04-09

## 2022-03-20 PROBLEM — B00.1 FEVER BLISTER: Status: ACTIVE | Noted: 2019-02-09

## 2023-08-31 ENCOUNTER — ANESTHESIA (OUTPATIENT)
Facility: HOSPITAL | Age: 73
End: 2023-08-31
Payer: MEDICARE

## 2023-08-31 ENCOUNTER — HOSPITAL ENCOUNTER (OUTPATIENT)
Facility: HOSPITAL | Age: 73
Setting detail: OUTPATIENT SURGERY
Discharge: HOME | End: 2023-08-31
Attending: INTERNAL MEDICINE | Admitting: INTERNAL MEDICINE
Payer: MEDICARE

## 2023-08-31 ENCOUNTER — ANESTHESIA EVENT (OUTPATIENT)
Facility: HOSPITAL | Age: 73
End: 2023-08-31
Payer: MEDICARE

## 2023-08-31 VITALS
DIASTOLIC BLOOD PRESSURE: 46 MMHG | RESPIRATION RATE: 15 BRPM | OXYGEN SATURATION: 100 % | BODY MASS INDEX: 27.09 KG/M2 | HEIGHT: 60 IN | TEMPERATURE: 97.2 F | WEIGHT: 138 LBS | SYSTOLIC BLOOD PRESSURE: 109 MMHG | HEART RATE: 72 BPM

## 2023-08-31 PROCEDURE — 3700000000 HC ANESTHESIA ATTENDED CARE: Performed by: INTERNAL MEDICINE

## 2023-08-31 PROCEDURE — C1726 CATH, BAL DIL, NON-VASCULAR: HCPCS | Performed by: INTERNAL MEDICINE

## 2023-08-31 PROCEDURE — 3700000001 HC ADD 15 MINUTES (ANESTHESIA): Performed by: INTERNAL MEDICINE

## 2023-08-31 PROCEDURE — 6360000002 HC RX W HCPCS

## 2023-08-31 PROCEDURE — 3600007512: Performed by: INTERNAL MEDICINE

## 2023-08-31 PROCEDURE — 2580000003 HC RX 258

## 2023-08-31 PROCEDURE — 3600007502: Performed by: INTERNAL MEDICINE

## 2023-08-31 PROCEDURE — 7100000010 HC PHASE II RECOVERY - FIRST 15 MIN: Performed by: INTERNAL MEDICINE

## 2023-08-31 PROCEDURE — 7100000011 HC PHASE II RECOVERY - ADDTL 15 MIN: Performed by: INTERNAL MEDICINE

## 2023-08-31 PROCEDURE — 2709999900 HC NON-CHARGEABLE SUPPLY: Performed by: INTERNAL MEDICINE

## 2023-08-31 PROCEDURE — 88305 TISSUE EXAM BY PATHOLOGIST: CPT

## 2023-08-31 PROCEDURE — 2500000003 HC RX 250 WO HCPCS

## 2023-08-31 RX ORDER — SODIUM CHLORIDE 0.9 % (FLUSH) 0.9 %
5-40 SYRINGE (ML) INJECTION EVERY 12 HOURS SCHEDULED
Status: DISCONTINUED | OUTPATIENT
Start: 2023-08-31 | End: 2023-08-31 | Stop reason: HOSPADM

## 2023-08-31 RX ORDER — SODIUM CHLORIDE 0.9 % (FLUSH) 0.9 %
5-40 SYRINGE (ML) INJECTION PRN
Status: DISCONTINUED | OUTPATIENT
Start: 2023-08-31 | End: 2023-08-31 | Stop reason: HOSPADM

## 2023-08-31 RX ORDER — SODIUM CHLORIDE 9 MG/ML
INJECTION, SOLUTION INTRAVENOUS CONTINUOUS PRN
Status: DISCONTINUED | OUTPATIENT
Start: 2023-08-31 | End: 2023-08-31 | Stop reason: SDUPTHER

## 2023-08-31 RX ORDER — GABAPENTIN 100 MG/1
10 CAPSULE ORAL 3 TIMES DAILY
COMMUNITY

## 2023-08-31 RX ORDER — SODIUM CHLORIDE 9 MG/ML
25 INJECTION, SOLUTION INTRAVENOUS PRN
Status: DISCONTINUED | OUTPATIENT
Start: 2023-08-31 | End: 2023-08-31 | Stop reason: HOSPADM

## 2023-08-31 RX ORDER — SEMAGLUTIDE 1.34 MG/ML
10 INJECTION, SOLUTION SUBCUTANEOUS
COMMUNITY

## 2023-08-31 RX ORDER — SODIUM CHLORIDE 9 MG/ML
INJECTION, SOLUTION INTRAVENOUS CONTINUOUS
Status: DISCONTINUED | OUTPATIENT
Start: 2023-08-31 | End: 2023-08-31 | Stop reason: HOSPADM

## 2023-08-31 RX ORDER — EZETIMIBE 10 MG/1
10 TABLET ORAL DAILY
COMMUNITY

## 2023-08-31 RX ADMIN — PROPOFOL 40 MG: 10 INJECTION, EMULSION INTRAVENOUS at 09:45

## 2023-08-31 RX ADMIN — SODIUM CHLORIDE: 9 INJECTION, SOLUTION INTRAVENOUS at 09:32

## 2023-08-31 RX ADMIN — PROPOFOL 30 MG: 10 INJECTION, EMULSION INTRAVENOUS at 09:42

## 2023-08-31 RX ADMIN — PROPOFOL 80 MG: 10 INJECTION, EMULSION INTRAVENOUS at 09:36

## 2023-08-31 RX ADMIN — PROPOFOL 50 MG: 10 INJECTION, EMULSION INTRAVENOUS at 09:39

## 2023-08-31 RX ADMIN — LIDOCAINE HYDROCHLORIDE 80 MG: 20 INJECTION, SOLUTION EPIDURAL; INFILTRATION; INTRACAUDAL; PERINEURAL at 09:36

## 2023-08-31 ASSESSMENT — PAIN - FUNCTIONAL ASSESSMENT: PAIN_FUNCTIONAL_ASSESSMENT: NONE - DENIES PAIN

## 2023-08-31 NOTE — OP NOTE
[address], [address]  [phone]                              Esophagogastroduodenoscopy (EGD) Procedure Note    NAME:  Princess Bray     :   1950     MRN:   [id number]     Date/Time:  2023 9:52 AM    :  Mimi Curry MD    Staff: Circulator: Chalo Pineda RN  Endoscopy Technician: Abisai Fowler    Referring Provider:  Eleni Garrison MD    Anethesia/Sedation:  MAC anesthesia    Procedure Details   After infomed consent was obtained for the procedure, with all risks and benefits of procedure explained the patient was taken to the endoscopy suite and placed in the left lateral decubitus position. Following sequential administration of sedation as per above, the gastroscope was inserted into the mouth and advanced under direct vision to second portion of the duodenum. A careful inspection was made as the gastroscope was withdrawn, including a retroflexed view of the proximal stomach; findings and interventions are described below. Findings:  Esophagus:normal esophageal mucosa - biopsied. GE junction 38 cm from the incisors with non-obstructing Shatzki's ring, dilated with 18 mm balloon dilator. Small (1 cm) sliding hiatal hernia (Hill grade II GEFV)  Stomach:normal   Duodenum/jejunum:normal    Interventions:  biopsies and dilation            Specimens Removed:  * No specimens in log *    Complications: None. EBL:  minimal     Impression:    See Postoperative diagnosis above    Recommendations:   - Await pathology. You should receive a letter within 2 weeks. - Resume normal medications. Restart plavix tomorrow. - Continue omeprazole once daily.      Discharge disposition:  Home in the company of  when able to ambulate    Mimi Curry MD

## 2023-08-31 NOTE — H&P
[address]  [phone]                                History and Physical     NAME: Maybelle Carrel   :  1950   MRN:  [id number]     HPI:  The patient was seen and examined. Past Surgical History:   Procedure Laterality Date    BREAST BIOPSY Right     COLONOSCOPY  2016    Polypectomy Serrated Adenoma. f/u [facility] 3 yrs    COLONOSCOPY  2020    Diverticulosis . f/u 3 years Dr. Luis Eduardo Banda     COLONOSCOPY N/A 2019    COLONOSCOPY performed by Maryanne Chang MD at [facility] ENDOSCOPY. Tubular Adenoma and Serrated Adenoma. f/u 1 yr. Internal Hemorrhoids. Diverticuli . 2 polypectomies. f/u colonoscopy in 1 y     GYN      C section x 3; Later, Laparoscopic Lysis of Adhesions    OTHER SURGICAL HISTORY      csection    UPPER GASTROINTESTINAL ENDOSCOPY  2016    Dilatation of Schatzki's Ring     Past Medical History:   Diagnosis Date    Amaurosis fugax of right eye     3/29/21 , eval'd by Dr. Sarah Macario and later by Dr. Chance Persons     Chronic insomnia     DDD (degenerative disc disease), lumbar 2019     Xray Lumbar spine: Mild generalized Osteopenia. Mod DDD L5-S1.mild degenerative change in rest Lumbar Spine. very minimal left lumbar scoliosis. EBT (electron beam tomography) abnormal 2019    May 2013 . Coronary Calcium score 37, c/w mild Non obstructive Coronary artery plaque.   [facility]: no significant [facility]     Fever blister 2019    Foot fracture, left 2013    left 5th Metatarsal     GERD (gastroesophageal reflux disease)     Schatzki's ring , tx'd with EGD and dilatation. Fullness in throat sensation attributed to Globus Pharyngeus     Hypercholesterolemia     Impingement syndrome of left shoulder 2019    Kidney stones, calcium oxalate 2019    Lithotripsy     Left knee DJD 10/17/2017    Xray . Mild degenerative changes left knee.  Osteopenia    Mild acquired hearing loss 2018    MIld b/l symmetrical high frequency hearing loss on hearing testing at [facility]     Mitral regurgitation 10/10/2019    [facility]: EF 60-65 %. Trivial MR. mild diastolic dysfunction    Moderate asthma 2/9/2019    Osteopenia 2/9/2019    tx : Reclast     Osteoporosis 09/22/2021    Radius  T score -2.2  L spine T -1.4 . Total hip T -1.5 ; femoral neck T -1.7 . [facility] Dr. Steve Yan Endocrinology     Other chest pain 11/24/2019 11/19/19 Coronary angiogram: by Dr. Lilliana Leyva MD , [facility];. No significant coronary disease. Right Dominant Circulation. Normal Left sided filling pressures. Echo: EF. 60-65% Borderline Mitral Valve Prolapse. Trivial MR. No AS/AI. Mild Diastolic Dysfunction    Other ill-defined conditions(799.89)     high cholesterol    Patellar dislocation     Partial dislocation , left knee     Stroke (cerebrum) ([address])     3/21/2021 : right facial droop, associated Transient Global Amnesia. eval'd by Dr. Ban Byrne and later by Dr. Amada Ojeda     Thyroid disease     in her 25s took thyroid medication briefly. Social History     Tobacco Use    Smoking status: Never    Smokeless tobacco: Never   Substance Use Topics    Alcohol use: Yes     Allergies   Allergen Reactions    Alendronate Other (See Comments)     Throat irritation   Other reaction(s): Other (see comments)  Throat irritation     Chlorhexidine Itching    Ibandronic Acid Other (See Comments)     Throat irritation    Penicillins Rash     Other reaction(s): Other     Family History   Problem Relation Age of Onset    Elevated Lipids Father     Hypertension Father     Stroke Brother     Heart Disease Father     Heart Disease Brother     Heart Disease Mother     Stroke Mother      No current facility-administered medications for this encounter. PHYSICAL EXAM:  General: WD, WN. Alert, cooperative, no acute distress    HEENT: NC, Atraumatic. PERRLA, EOMI. Anicteric sclerae. Lungs:  CTA Bilaterally.  No

## 2023-08-31 NOTE — DISCHARGE INSTRUCTIONS
[address]  [address]   [id number]  1950    It was my pleasure seeing you for your procedure. You will also receive a summary report with the findings from this procedure and any further recommendations. If you had polyps removed or biopsies taken during your procedure, you will receive a separate letter from me within approximately the next 2 weeks. If you don't receive this letter or if you have any questions, please call my office [phone]. Please take note of the post procedure instructions listed below. Dr. Yonatan Escalona    These instructions give you information on caring for yourself after your procedure. Call your doctor if you have any problems or questions after your procedure. HOME CARE  Walk if you have belly cramping or gas. Walking will help get rid of the air and reduce the bloated feeling in your belly (abdomen). Your IV site (where you received drugs) may be tender to touch. Place warm towels on the site; keep your arm up on two pillows if you have any swelling or soreness in the area. You may shower. ACTIVITY:  Take frequent rest periods and move at a slower pace for the next 24 hours. .  You may resume your regular activity tomorrow if you are feeling back to normal.  Do not drive or ride a bicycle for at least 24 hours (because of the medicine (anesthesia) used during the test). Do not sign any important legal documents or use or operate any machinery for 24 hours  Do not take sleeping medicines/nerve drugs for 24 hours unless the doctor tells you. You can return to work/school tomorrow unless otherwise instructed. NUTRITION:  Drink plenty of fluids to keep your pee (urine) clear or pale yellow  Begin with a light meal and progress to your normal diet.  Heavy or fried foods are harder to digest and may make

## 2023-08-31 NOTE — ANESTHESIA PRE PROCEDURE
Department of Anesthesiology  Preprocedure Note       Name:  Cordelia Buerger   Age:  72 y.o.  :  1950                                          MRN:  [id number]         Date:  2023      Surgeon: Lucian Farfan):  German Chavez MD    Procedure: Procedure(s):  EGD ESOPHAGOGASTRODUODENOSCOPY, dilation, and biopsy    Medications prior to admission:   Prior to Admission medications    Medication Sig Start Date End Date Taking? Authorizing Provider   ezetimibe (ZETIA) 10 MG tablet Take 1 tablet by mouth daily   Yes Historical Provider, MD   Semaglutide,0.25 or 0.5MG/DOS, (OZEMPIC, 0.25 OR 0.5 MG/DOSE,) 2 MG/1.5ML SOPN Inject 10 mg into the skin   Yes Historical Provider, MD   gabapentin (NEURONTIN) 100 MG capsule Take 10 mg by mouth 3 times daily.  Max Daily Amount: 30 mg   Yes Historical Provider, MD   Suvorexant (BELSOMRA PO) Take by mouth   Yes Historical Provider, MD   acetaminophen (TYLENOL) 325 MG tablet Take by mouth every 4 hours as needed    Ar Automatic Reconciliation   albuterol sulfate HFA (PROVENTIL;VENTOLIN;PROAIR) 108 (90 Base) MCG/ACT inhaler Inhale 2 puffs into the lungs every 4 hours as needed 20   Ar Automatic Reconciliation   cetirizine (ZYRTEC) 10 MG tablet Take 2 tablets by mouth    Ar Automatic Reconciliation   CLINDAMYCIN PHOSPHATE,TOPICAL, 1 % SWAB Apply topically    Ar Automatic Reconciliation   clopidogrel (PLAVIX) 75 MG tablet Take 1 tablet by mouth    Ar Automatic Reconciliation   diazePAM (VALIUM) 5 MG tablet TAKE 1 TO 2 TABLETS BY MOUTH AT BEDTIME AS NEEDED 10/7/21   Ar Automatic Reconciliation   Melatonin 10 MG TABS Take by mouth    Ar Automatic Reconciliation   metroNIDAZOLE (METROCREAM) 0.75 % cream Apply topically 2 times daily    Ar Automatic Reconciliation   mometasone (ASMANEX HFA) 200 MCG/ACT AERO inhaler Use 1 to 2 puffs once or twice daily as directed 20   Ar Automatic Reconciliation   pantoprazole (PROTONIX) 40 MG tablet Take 1 tablet by mouth daily    Ar Automatic

## 2023-08-31 NOTE — ANESTHESIA POSTPROCEDURE EVALUATION
Department of Anesthesiology  Postprocedure Note    Patient:  Lorena Link  MRN: [id number]  YOB: 1950  Date of evaluation: 8/31/2023      Procedure Summary     Date: 08/31/23 Room / Location: [address] ENDOSCOPY    Anesthesia Start: 3975 Anesthesia Stop: 0244    Procedure: EGD ESOPHAGOGASTRODUODENOSCOPY, dilation, and biopsy (Upper GI Region) Diagnosis:       Dysphagia, unspecified type      (Dysphagia, unspecified type [R13.10])    Surgeons: Maritza Meza MD Responsible Provider: Kailyn Cardona MD    Anesthesia Type: MAC ASA Status: 2          Anesthesia Type: MAC    Oc Phase I: Oc Score: 10    Oc Phase II: Oc Score: 9      Anesthesia Post Evaluation    Patient location during evaluation: PACU  Patient participation: complete - patient participated  Level of consciousness: awake  Pain score: 2  Airway patency: patent  Nausea & Vomiting: no nausea  Complications: no  Cardiovascular status: blood pressure returned to baseline  Respiratory status: acceptable  Hydration status: euvolemic  Pain management: adequate

## 2024-08-21 ENCOUNTER — APPOINTMENT (OUTPATIENT)
Facility: HOSPITAL | Age: 74
End: 2024-08-21
Payer: MEDICARE

## 2024-08-21 ENCOUNTER — HOSPITAL ENCOUNTER (EMERGENCY)
Facility: HOSPITAL | Age: 74
Discharge: HOME | End: 2024-08-21
Attending: EMERGENCY MEDICINE
Payer: MEDICARE

## 2024-08-21 VITALS
WEIGHT: 126.32 LBS | BODY MASS INDEX: 24.8 KG/M2 | SYSTOLIC BLOOD PRESSURE: 150 MMHG | RESPIRATION RATE: 18 BRPM | DIASTOLIC BLOOD PRESSURE: 77 MMHG | TEMPERATURE: 98.4 F | HEIGHT: 60 IN | OXYGEN SATURATION: 98 % | HEART RATE: 72 BPM

## 2024-08-21 DIAGNOSIS — S09.93XA FACIAL INJURY, INITIAL ENCOUNTER: Primary | ICD-10-CM
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
## 2024-08-21 LAB
ALBUMIN SERPL-MCNC: 4 G/DL (ref 3.5–5)
ALBUMIN/GLOB SERPL: 1.2 (ref 1.1–2.2)
ALP SERPL-CCNC: 110 U/L (ref 45–117)
ALT SERPL-CCNC: 16 U/L (ref 12–78)
ANION GAP SERPL CALC-SCNC: 3 MMOL/L (ref 5–15)
AST SERPL-CCNC: 15 U/L (ref 15–37)
BASOPHILS # BLD: 0 K/UL (ref 0–0.1)
BASOPHILS NFR BLD: 0 % (ref 0–1)
BILIRUB SERPL-MCNC: 0.5 MG/DL (ref 0.2–1)
BUN SERPL-MCNC: 18 MG/DL (ref 6–20)
BUN/CREAT SERPL: 23 (ref 12–20)
CALCIUM SERPL-MCNC: 9.4 MG/DL (ref 8.5–10.1)
CHLORIDE SERPL-SCNC: 109 MMOL/L (ref 97–108)
CO2 SERPL-SCNC: 26 MMOL/L (ref 21–32)
CREAT SERPL-MCNC: 0.77 MG/DL (ref 0.55–1.02)
DIFFERENTIAL METHOD BLD: NORMAL
EKG ATRIAL RATE: 73 BPM
EKG DIAGNOSIS: NORMAL
EKG P AXIS: 64 DEGREES
EKG P-R INTERVAL: 156 MS
EKG Q-T INTERVAL: 420 MS
EKG QRS DURATION: 68 MS
EKG QTC CALCULATION (BAZETT): 462 MS
EKG R AXIS: 4 DEGREES
EKG T AXIS: 20 DEGREES
EKG VENTRICULAR RATE: 73 BPM
EOSINOPHIL # BLD: 0.1 K/UL (ref 0–0.4)
EOSINOPHIL NFR BLD: 1 % (ref 0–7)
ERYTHROCYTE [DISTWIDTH] IN BLOOD BY AUTOMATED COUNT: 13.2 % (ref 11.5–14.5)
GLOBULIN SER CALC-MCNC: 3.3 G/DL (ref 2–4)
GLUCOSE SERPL-MCNC: 87 MG/DL (ref 65–100)
HCT VFR BLD AUTO: 42.6 % (ref 35–47)
HGB BLD-MCNC: 13.8 G/DL (ref 11.5–16)
IMM GRANULOCYTES # BLD AUTO: 0 K/UL (ref 0–0.04)
IMM GRANULOCYTES NFR BLD AUTO: 0 % (ref 0–0.5)
LYMPHOCYTES # BLD: 1.9 K/UL (ref 0.8–3.5)
LYMPHOCYTES NFR BLD: 22 % (ref 12–49)
MCH RBC QN AUTO: 30.7 PG (ref 26–34)
MCHC RBC AUTO-ENTMCNC: 32.4 G/DL (ref 30–36.5)
MCV RBC AUTO: 94.9 FL (ref 80–99)
MONOCYTES # BLD: 0.5 K/UL (ref 0–1)
MONOCYTES NFR BLD: 5 % (ref 5–13)
NEUTS SEG # BLD: 6.3 K/UL (ref 1.8–8)
NEUTS SEG NFR BLD: 72 % (ref 32–75)
NRBC # BLD: 0 K/UL (ref 0–0.01)
NRBC BLD-RTO: 0 PER 100 WBC
PLATELET # BLD AUTO: 288 K/UL (ref 150–400)
PMV BLD AUTO: 10.8 FL (ref 8.9–12.9)
POTASSIUM SERPL-SCNC: 3.8 MMOL/L (ref 3.5–5.1)
PROT SERPL-MCNC: 7.3 G/DL (ref 6.4–8.2)
RBC # BLD AUTO: 4.49 M/UL (ref 3.8–5.2)
SODIUM SERPL-SCNC: 138 MMOL/L (ref 136–145)
TROPONIN I SERPL HS-MCNC: 4 NG/L (ref 0–51)
WBC # BLD AUTO: 8.8 K/UL (ref 3.6–11)

## 2024-08-21 PROCEDURE — 73110 X-RAY EXAM OF WRIST: CPT

## 2024-08-21 PROCEDURE — 70450 CT HEAD/BRAIN W/O DYE: CPT

## 2024-08-21 PROCEDURE — 93005 ELECTROCARDIOGRAM TRACING: CPT

## 2024-08-21 PROCEDURE — 80053 COMPREHEN METABOLIC PANEL: CPT

## 2024-08-21 PROCEDURE — 6370000000 HC RX 637 (ALT 250 FOR IP)

## 2024-08-21 PROCEDURE — 99285 EMERGENCY DEPT VISIT HI MDM: CPT

## 2024-08-21 PROCEDURE — 85025 COMPLETE CBC W/AUTO DIFF WBC: CPT

## 2024-08-21 PROCEDURE — 72125 CT NECK SPINE W/O DYE: CPT

## 2024-08-21 PROCEDURE — 36415 COLL VENOUS BLD VENIPUNCTURE: CPT

## 2024-08-21 PROCEDURE — 84484 ASSAY OF TROPONIN QUANT: CPT

## 2024-08-21 PROCEDURE — 70486 CT MAXILLOFACIAL W/O DYE: CPT

## 2024-08-21 RX ORDER — ACETAMINOPHEN 500 MG
1000 TABLET ORAL
Status: COMPLETED | OUTPATIENT
Start: 2024-08-21 | End: 2024-08-21

## 2024-08-21 RX ADMIN — ACETAMINOPHEN 1000 MG: 500 TABLET ORAL at 15:48

## 2024-08-21 ASSESSMENT — PAIN DESCRIPTION - ORIENTATION
ORIENTATION: LEFT
ORIENTATION: ANTERIOR

## 2024-08-21 ASSESSMENT — PAIN DESCRIPTION - LOCATION
LOCATION: FACE
LOCATION: FACE;TEETH

## 2024-08-21 ASSESSMENT — PAIN - FUNCTIONAL ASSESSMENT
PAIN_FUNCTIONAL_ASSESSMENT: 0-10
PAIN_FUNCTIONAL_ASSESSMENT: ACTIVITIES ARE NOT PREVENTED
PAIN_FUNCTIONAL_ASSESSMENT: ACTIVITIES ARE NOT PREVENTED

## 2024-08-21 ASSESSMENT — PAIN SCALES - GENERAL
PAINLEVEL_OUTOF10: 4
PAINLEVEL_OUTOF10: 3
PAINLEVEL_OUTOF10: 5

## 2024-08-21 ASSESSMENT — PAIN DESCRIPTION - FREQUENCY: FREQUENCY: CONTINUOUS

## 2024-08-21 ASSESSMENT — PAIN DESCRIPTION - ONSET: ONSET: ON-GOING

## 2024-08-21 ASSESSMENT — PAIN DESCRIPTION - DESCRIPTORS
DESCRIPTORS: ACHING;SORE
DESCRIPTORS: ACHING

## 2024-08-21 ASSESSMENT — PAIN DESCRIPTION - PAIN TYPE: TYPE: ACUTE PAIN

## 2024-08-21 NOTE — DISCHARGE INSTRUCTIONS
You were seen and evaluated here today for all causing facial injury.  We did scans of your head, neck, face, and wrist.  These were all negative for any fractures or intracranial bleeds.  We also did some lab work to look at your electrolytes as well as your heart.  These all came back normal.  I recommend that you continue taking Tylenol for the pain and icing the sore areas.  It may take a while for the bruising on your face to heal given that you are on the Plavix.  I recommend that you follow-up with your primary care provider to ensure that this is improving.

## 2024-08-21 NOTE — ED TRIAGE NOTES
Patient fell on concrete today in front of the museum today. Patient does not remember the fall. Patient has abrasions to the face.

## 2024-08-21 NOTE — ED PROVIDER NOTES
[facility]  EMERGENCY DEPARTMENT ENCOUNTER      Pt Name: Mariah Jenkins  MRN: [id number]  Birthdate 1950  Date of evaluation: 8/21/2024  Provider: Duy Loyd PA-C    CHIEF COMPLAINT       Chief Complaint   Patient presents with    Fall         HISTORY OF PRESENT ILLNESS   (Location/Symptom, Timing/Onset, Context/Setting, Quality, Duration, Modifying Factors, Severity)  Note limiting factors.   74-year-old female with past medical history as below presents with complaint of fall with facial injury.  Patient reports that she was walking to the museum today when she suddenly fell.  She hit the left side of her face on the ground.  She does not recall the fall.  She denies passing out.  She denies losing consciousness after hitting the ground.  She has not had any vomiting since.  She denies dizziness, chest pain, shortness of breath, or any other complaints prior to the fall.  She is not sure what happened.  She does not recall if she tripped.  She takes Plavix, however no anticoagulants.  She is also admitting to some left wrist discomfort.            Review of External Medical Records:     Nursing Notes were reviewed.    REVIEW OF SYSTEMS    (2-9 systems for level 4, 10 or more for level 5)     Review of Systems    Except as noted above the remainder of the review of systems was reviewed and negative.       PAST MEDICAL HISTORY     Past Medical History:   Diagnosis Date    Amaurosis fugax of right eye     3/29/21 , eval'd by Dr. Warner and later by Dr. Julian Walls     Chronic insomnia     DDD (degenerative disc disease), lumbar 01/11/2019     Xray Lumbar spine: Mild generalized Osteopenia. Mod DDD L5-S1.mild degenerative change in rest Lumbar Spine. very minimal left lumbar scoliosis.    EBT (electron beam tomography) abnormal 02/09/2019    May 2013 . Coronary Calcium score 37, c/w mild Non obstructive Coronary artery plaque. 2019  [facility] Cath: no significant CAD     Fever blister 2/9/2019

## 2025-08-20 ENCOUNTER — TRANSCRIBE ORDERS (OUTPATIENT)
Facility: HOSPITAL | Age: 75
End: 2025-08-20

## 2025-08-20 DIAGNOSIS — R10.9 ABDOMINAL PAIN, UNSPECIFIED ABDOMINAL LOCATION: ICD-10-CM

## 2025-08-20 DIAGNOSIS — N20.0 CALCULUS OF KIDNEY: Primary | ICD-10-CM

## (undated) DEVICE — REM POLYHESIVE ADULT PATIENT RETURN ELECTRODE: Brand: VALLEYLAB

## (undated) DEVICE — DILATOR ENDOSCP L180CM DIA6FR BLLN L8CM DIA45 54FR 3 45 7ATM

## (undated) DEVICE — FORCEPS BX L240CM JAW DIA2.4MM ORNG L CAP W/ NDL DISP RAD

## (undated) DEVICE — NEEDLE SCLERO 23GA L4MM CATH L240CM CNTRST SHTH DIA1.8MM

## (undated) DEVICE — TUBING HYDR IRR --

## (undated) DEVICE — (D)AGENT INJECTN ELEVIEW 10ML -- DISC BY MFG

## (undated) DEVICE — SNARE ENDOSCP M L240CM W27MM SHTH DIA2.4MM CHN 2.8MM HEX

## (undated) DEVICE — SNARE ENDOSCP M L240CM W27MM SHTH DIA2.4MM CHN 2.8MM OVL

## (undated) DEVICE — SYRINGE INFL 60ML DISP ALLIANCE II